# Patient Record
Sex: MALE | Race: WHITE | Employment: FULL TIME | URBAN - METROPOLITAN AREA
[De-identification: names, ages, dates, MRNs, and addresses within clinical notes are randomized per-mention and may not be internally consistent; named-entity substitution may affect disease eponyms.]

---

## 2017-09-05 ENCOUNTER — HOSPITAL ENCOUNTER (OUTPATIENT)
Dept: PHYSICAL THERAPY | Age: 62
Discharge: HOME | End: 2017-09-05
Payer: COMMERCIAL

## 2017-09-05 PROCEDURE — 97140 MANUAL THERAPY 1/> REGIONS: CPT

## 2017-09-05 PROCEDURE — 97535 SELF CARE MNGMENT TRAINING: CPT

## 2017-09-05 PROCEDURE — 97162 PT EVAL MOD COMPLEX 30 MIN: CPT

## 2017-09-05 NOTE — PROGRESS NOTES
[address] [place] [address] PHYSICAL THERAPY  [address] [place] [address] 201,[facility], 70 [facility] - Phone: [phone]  Fax: [phone] / [address] [facility]  Patient Name: Logan Ponce : 1955   Medical   Diagnosis: Right shoulder pain [M25.511] Treatment Diagnosis: Right shoulder pain [M25.511]   Onset Date: 17     Referral Source: Marcela Baxter Start of Care [facility]): 2017   Prior Hospitalization: See medical history Provider #: [id number]   Prior Level of Function: Pain with reaching activities   Comorbidities: OA   Medications: Verified on Patient Summary List   The Plan of Care and following information is based on the information from the initial evaluation.   ===========================================================================================  Assessment / denis information:  Kellie Taylor is a 65 y.o.  yo male with Dx of Right shoulder pain [M25.511]. He reports having R TSA on 17. He currently rates his pain as 8/10 at worst, 2/10 at best, primarily located at anterolateral aspect of his R shoulder. Objective Findings:  R Shoulder PROM: Flx = 92 deg, Abd = 90 deg:  Pt instructed in [facility] and will f/u in clinic for PT.  ===========================================================================================  Eval Complexity: History MEDIUM  Complexity : 1-2 comorbidities / personal factors will impact the outcome/ POC ;  Examination  MEDIUM Complexity : 3 Standardized tests and measures addressing body structure, function, activity limitation and / or participation in recreation ; Presentation MEDIUM Complexity : Evolving with changing characteristics ;   Decision Making MEDIUM Complexity : FOTO score of 26-74; Overall Complexity MEDIUM  Problem List: pain affecting function, decrease ROM, decrease strength, decrease ADL/ functional abilitiies, decrease activity tolerance and decrease flexibility/ joint mobility   Treatment Plan may include any combination of the following: Therapeutic exercise, Therapeutic activities, Neuromuscular re-education, Physical agent/modality, Manual therapy, Patient education, Self Care training and Functional mobility training  Patient / Family readiness to learn indicated by: asking questions, trying to perform skills and interest  Persons(s) to be included in education: patient (P)  Barriers to Learning/Limitations: no  Measures taken: FOTO = 39%   Patient Goal (s): Improve mobility of R shoulder   Patient self reported health status: good  Rehabilitation Potential: good   Short Term Goals: To be accomplished in  1-2  weeks:  1. Independent with HEP. 2. Decrease max pain 25-50% to assist with reaching and lifting    Long Term Goals: To be accomplished in  3-4  weeks:  1. Decrease max pain 50-75% to assist with reaching and lifting  2. Increase FOTO score to 45% to show functional improvment. 3.  Increase R shoulder PROM to 140 deg to assist with ADLs  Frequency / Duration:   Patient to be seen  2-3  times per week for 3-4  weeks:  Patient / Caregiver education and instruction: self care and exercises    Therapist Signature: Amanda Akers, CYNDI, OCS, SCS, CSCS Date: 0/0/2411   Certification Period: na Time: 6:25 PM   ===========================================================================================  I certify that the above Physical Therapy Services are being furnished while the patient is under my care. I agree with the treatment plan and certify that this therapy is necessary. Physician Signature:        Date:       Time:     Please sign and return to [facility] or you may fax the signed copy to [phone]. Thank you.

## 2017-09-05 NOTE — PROGRESS NOTES
PHYSICAL THERAPY - DAILY TREATMENT NOTE    Patient Name: Kellie Taylor        Date: 2017  : 1955   YES Patient  Verified  Visit #:     Insurance: Payor: /      In time: 5:50 Out time: 6:20   Total Treatment Time: 30     Medicare Time Tracking (below)   Total Timed Codes (min):  na 1:1 Treatment Time:  na     TREATMENT AREA =  Right shoulder pain [M25.511]    SUBJECTIVE  Pain Level (on 0 to 10 scale):    Medication Changes/New allergies or changes in medical history, any new surgeries or procedures? NO    If yes, update Summary List   Subjective Functional Status/Changes:  []  No changes reported     SEE IE          OBJECTIVE      10 min Manual Therapy: [address] mob, PROM Flx/Abd to 90 deg   Rationale:      decrease pain, increase ROM and increase tissue extensibility to improve patient's ability to reach/lift     min Patient Education:  YES  Reviewed HEP   []  Progressed/Changed HEP based on: Other Objective/Functional Measures:    SEE IE     Post Treatment Pain Level (on 0 to 10) scale:       ASSESSMENT  Assessment/Changes in Function:     SEE IE     []  See Progress Note/Recertification   Patient will continue to benefit from skilled PT services to modify and progress therapeutic interventions, address functional mobility deficits, address ROM deficits, address strength deficits, analyze and address soft tissue restrictions, analyze and cue movement patterns, analyze and modify body mechanics/ergonomics and assess and modify postural abnormalities to attain remaining goals.    Progress toward goals / Updated goals:         PLAN  []  Upgrade activities as tolerated YES Continue plan of care   []  Discharge due to :    []  Other:      Therapist: Tima Prado, PT, OCS, SCS, CSCS    Date: 2017 Time: 6:24 PM       Future Appointments  Date Time Provider Mena Kumar   2018 8:15 AM Alexei Roldan MD [id number]

## 2017-09-07 ENCOUNTER — HOSPITAL ENCOUNTER (OUTPATIENT)
Dept: PHYSICAL THERAPY | Age: 62
Discharge: HOME | End: 2017-09-07
Payer: COMMERCIAL

## 2017-09-07 PROCEDURE — 97140 MANUAL THERAPY 1/> REGIONS: CPT | Performed by: PHYSICAL THERAPIST

## 2017-09-07 PROCEDURE — 97110 THERAPEUTIC EXERCISES: CPT | Performed by: PHYSICAL THERAPIST

## 2017-09-07 NOTE — PROGRESS NOTES
[facility] PHYSICAL THERAPY - DAILY TREATMENT NOTE    Patient Name: Geoffrey Strickland        Date: 2017  : 1955   yes Patient  Verified  Visit #:     Insurance: Payor: Jennie Townsend / Plan: [address] / Product Type: Commerical /      In time: 530 Out time: 620   Total Treatment Time: 50     Medicare Time Tracking (below)   Total Timed Codes (min):  na 1:1 Treatment Time:  na     TREATMENT AREA =  Right shoulder pain [M25.511]    SUBJECTIVE  Pain Level (on 0 to 10 scale):  4  / 10   Medication Changes/New allergies or changes in medical history, any new surgeries or procedures?    no  If yes, update Summary List   Subjective Functional Status/Changes:  []  No changes reported     No pain or issues with the home exercises.  A little sore after the evaluation          OBJECTIVE  Modalities Rationale:     decrease inflammation, decrease pain and increase tissue extensibility to improve patient's ability to don/doff sling    min [] Estim, type/location:                                      []  att     []  unatt     []  w/US     []  w/ice    []  w/heat    min []  Mechanical Traction: type/lbs                   []  pro   []  sup   []  int   []  cont    []  before manual    []  after manual    min []  Ultrasound, settings/location:      min []  Iontophoresis w/ dexamethasone, location:                                               []  take home patch       []  in clinic   10 min [x]  Ice     []  Heat    location/position: Supine with bolster and red gripper    min []  Vasopneumatic Device, press/temp:     min []  Other:    [x] Skin assessment post-treatment (if applicable):    [x]  intact    []  redness- no adverse reaction     []redness  adverse reaction:        15 min Therapeutic Exercise:  [x]  See flow sheet   Rationale:      increase ROM and increase strength to improve the patients ability to don/doff sling     25 min Manual Therapy: ghj prom flexion/abd, elbow prom, stm anterior shoulder, pec and ut release   Rationale:      decrease pain, increase ROM, increase tissue extensibility and decrease trigger points to improve patient's ability to don/doff sling       min Patient Education:  yes  Reviewed HEP   []  Progressed/Changed HEP based on: Other Objective/Functional Measures:    Reported tenderness along anterior shoulder   Completed te as per flow sheet     Post Treatment Pain Level (on 0 to 10) scale:   2  / 10     ASSESSMENT  Assessment/Changes in Function:     No increase in pain following initial te session      []  See Progress Note/Recertification   Patient will continue to benefit from skilled PT services to modify and progress therapeutic interventions, address functional mobility deficits, address ROM deficits, address strength deficits, analyze and address soft tissue restrictions, analyze and cue movement patterns, analyze and modify body mechanics/ergonomics, assess and modify postural abnormalities and instruct in home and community integration to attain remaining goals.    Progress toward goals / Updated goals:    Compliant with hep     PLAN  []  Upgrade activities as tolerated yes Continue plan of care   []  Discharge due to :    []  Other:      Therapist: Maria Ines Cordero PT    Date: 9/7/2017 Time: 6:16 PM     Future Appointments  Date Time Provider Mena Kumar   9/12/2017 5:00 PM Jim Newton PT [facility]   9/14/2017 4:00 PM Tami Encinas, PTA [facility]   9/19/2017 5:30 PM Jim Newton, PT [facility]   9/21/2017 5:00 PM Faizan Heramon, PT [facility]   9/26/2017 5:00 PM Jim Newton PT [facility]   9/28/2017 5:00 PM Arlys Heap, PT [facility]   10/3/2017 5:00 PM Jim Newton, PT [facility]   10/5/2017 5:00 PM Arlys Heramon, PT [facility]   5/29/2018 8:15 AM Darío Sims MD [id number]

## 2017-09-12 ENCOUNTER — HOSPITAL ENCOUNTER (OUTPATIENT)
Dept: PHYSICAL THERAPY | Age: 62
Discharge: HOME | End: 2017-09-12
Payer: COMMERCIAL

## 2017-09-12 PROCEDURE — 97140 MANUAL THERAPY 1/> REGIONS: CPT

## 2017-09-12 PROCEDURE — 97110 THERAPEUTIC EXERCISES: CPT

## 2017-09-12 NOTE — PROGRESS NOTES
PHYSICAL THERAPY - DAILY TREATMENT NOTE    Patient Name: Carole Augustin        Date: 2017  : 1955   YES Patient  Verified  Visit #:   3   of   12  Insurance: Payor: Manjinder Zhou / Plan:  [address] / Product Type: Commerical /      In time: 5:00 Out time: 5:45   Total Treatment Time: 45     Medicare Time Tracking (below)   Total Timed Codes (min):  na 1:1 Treatment Time:  na     TREATMENT AREA =  Right shoulder pain [M25.511]    SUBJECTIVE  Pain Level (on 0 to 10 scale):  4  / 10   Medication Changes/New allergies or changes in medical history, any new surgeries or procedures?     NO    If yes, update Summary List   Subjective Functional Status/Changes:  []  No changes reported     No new c/0          OBJECTIVE  Modalities Rationale:     decrease inflammation, decrease pain and increase tissue extensibility to improve patient's ability to don/doff sling                 min [] Estim, type/location:                                       []  att     []  unatt     []  w/US     []  w/ice    []  w/heat     min []  Mechanical Traction: type/lbs                    []  pro   []  sup   []  int   []  cont    []  before manual    []  after manual     min []  Ultrasound, settings/location:        min []  Iontophoresis w/ dexamethasone, location:                                                []  take home patch       []  in clinic   10 min [x]  Ice     []  Heat    location/position: Supine with bolster and red gripper     min []  Vasopneumatic Device, press/temp:       min []  Other:     [x] Skin assessment post-treatment (if applicable):    [x]  intact    []  redness- no adverse reaction     []redness  adverse reaction:         12 min Therapeutic Exercise:  [x]  See flow sheet   Rationale:      increase ROM and increase strength to improve the patients ability to don/doff sling      23 min Manual Therapy: ghj prom flexion/abd, elbow prom, DTM teres,  pec and ut release   Rationale:      decrease pain, increase ROM, increase tissue extensibility and decrease trigger points to improve patient's ability to don/doff sling     min Patient Education:  YES  Reviewed HEP   []  Progressed/Changed HEP based on: Other Objective/Functional Measures:    Able to achieve 100 deg of AAROM without increase in pain      Post Treatment Pain Level (on 0 to 10) scale:   4  / 10     ASSESSMENT  Assessment/Changes in Function:     Good cristela to all Rx without increase in pain      []  See Progress Note/Recertification   Patient will continue to benefit from skilled PT services to modify and progress therapeutic interventions, address functional mobility deficits, address ROM deficits, address strength deficits, analyze and address soft tissue restrictions, analyze and cue movement patterns, analyze and modify body mechanics/ergonomics and assess and modify postural abnormalities to attain remaining goals.    Progress toward goals / Updated goals:    Slowly progressing with pain reduction      PLAN  []  Upgrade activities as tolerated YES Continue plan of care   []  Discharge due to :    []  Other:      Therapist: Shea Stuart, PT, OCS, SCS, CSCS    Date: 9/12/2017 Time: 11:25 AM       Future Appointments  Date Time Provider Mena Kumar   9/12/2017 5:00 PM Niranjan Brink, PT [facility]   9/14/2017 4:00 PM Ayla Garnica, PTA [facility]   9/19/2017 5:30 PM Niranjan Brink, PT [facility]   9/21/2017 5:00 PM Sindhu Warren, PT [facility]   9/26/2017 5:00 PM Niranjan Brink, PT [facility]   9/28/2017 5:00 PM Sindhu Warren, PT [facility]   10/3/2017 5:00 PM Niranjan Brink, PT [facility]   10/5/2017 5:00 PM Sindhu Warren, PT [facility]   5/29/2018 8:15 AM Megan Ramos MD [id number]

## 2017-09-14 ENCOUNTER — HOSPITAL ENCOUNTER (OUTPATIENT)
Dept: PHYSICAL THERAPY | Age: 62
Discharge: HOME | End: 2017-09-14
Payer: COMMERCIAL

## 2017-09-14 PROCEDURE — 97110 THERAPEUTIC EXERCISES: CPT

## 2017-09-14 PROCEDURE — 97140 MANUAL THERAPY 1/> REGIONS: CPT

## 2017-09-14 NOTE — PROGRESS NOTES
PHYSICAL THERAPY - DAILY TREATMENT NOTE    Patient Name: Pierre Mnauel        Date: 2017  : 1955   YES Patient  Verified  Visit #:     Insurance: Payor: Erendira Osorio / Plan:  [address] [facility] / Product Type: Commerical /      In time: 355 Out time: 450   Total Treatment Time: 55     Medicare Time Tracking (below)   Total Timed Codes (min):  45 1:1 Treatment Time:       TREATMENT AREA =  Right shoulder pain [M25.511]    SUBJECTIVE  Pain Level (on 0 to 10 scale):  4  / 10   Medication Changes/New allergies or changes in medical history, any new surgeries or procedures? NO    If yes, update Summary List   Subjective Functional Status/Changes:  []  No changes reported     \"The (R) shoulder is feeling better than the (L) one now. \"        OBJECTIVE  Modalities Rationale:     decrease inflammation and decrease pain to improve patient's ability to perform pain free ADLs. min [] Estim, type/location:                                      []  att     []  unatt     []  w/US     []  w/ice    []  w/heat    min []  Mechanical Traction: type/lbs                   []  pro   []  sup   []  int   []  cont    []  before manual    []  after manual    min []  Ultrasound, settings/location:      min []  Iontophoresis w/ dexamethasone, location:                                               []  take home patch       []  in clinic   10 min [x]  Ice     []  Heat    location/position: Supine, (R) shoulder. min []  Vasopneumatic Device, press/temp:     min []  Other:    [x] Skin assessment post-treatment (if applicable):    [x]  intact    []  redness- no adverse reaction     []redness  adverse reaction:        30 min Therapeutic Exercise:  [x]  See flow sheet   Rationale:      increase ROM, increase strength and improve coordination to improve the patients ability to perform pain free overhead ADLs. 15 Min Manual Therapy: (R) shoulder PROM.      Rationale:      decrease pain, increase ROM, increase tissue extensibility and decrease trigger points to improve patient's ability to perform pain free overhead ADLs. min Patient Education:  YES  Reviewed HEP   []  Progressed/Changed HEP based on: Other Objective/Functional Measures: Therex per flow sheet. Post Treatment Pain Level (on 0 to 10) scale:   0  / 10     ASSESSMENT  Assessment/Changes in Function:     Difficulty relaxing shoulder during manual.  Good response to distraction w/ oscillation. []  See Progress Note/Recertification   Patient will continue to benefit from skilled PT services to modify and progress therapeutic interventions, address functional mobility deficits, address ROM deficits, address strength deficits, analyze and address soft tissue restrictions, analyze and cue movement patterns, analyze and modify body mechanics/ergonomics and assess and modify postural abnormalities to attain remaining goals. Progress toward goals / Updated goals:    No change in progress toward LTG's with today's session.       PLAN  [x]  Upgrade activities as tolerated YES Continue plan of care   []  Discharge due to :    []  Other:      Therapist: Fazal Shafer PTA    Date: 9/14/2017 Time: 4:44 PM     Future Appointments  Date Time Provider Mena Kumar   9/19/2017 5:30 PM Carlos Richardson, PT [facility]   9/21/2017 5:00 PM Lakshmi Easton, PT [facility]   9/26/2017 5:00 PM Carlos Richardson, PT [facility]   9/28/2017 5:00 PM Lakshmi Easton, PT [facility]   10/3/2017 5:00 PM Carlos Richardson PT [facility]   10/5/2017 5:00 PM Lakshmi Easton, PT [facility]   5/29/2018 8:15 AM Laureen Weathers MD [address] [facility]

## 2017-09-19 ENCOUNTER — HOSPITAL ENCOUNTER (OUTPATIENT)
Dept: PHYSICAL THERAPY | Age: 62
Discharge: HOME | End: 2017-09-19
Payer: COMMERCIAL

## 2017-09-19 PROCEDURE — 97110 THERAPEUTIC EXERCISES: CPT

## 2017-09-19 PROCEDURE — 97140 MANUAL THERAPY 1/> REGIONS: CPT

## 2017-09-19 NOTE — PROGRESS NOTES
PHYSICAL THERAPY - DAILY TREATMENT NOTE    Patient Name: Juaquin Zimmer        Date: 2017  : 1955   YES Patient  Verified  Visit #:     Insurance: Payor: Katie Reginain / Plan:  [address] [facility] / Product Type: Commerical /      In time: 5:30 Out time: 6:05   Total Treatment Time: 35     Medicare Time Tracking (below)   Total Timed Codes (min):  na 1:1 Treatment Time:  na     TREATMENT AREA =  Right shoulder pain [M25.511]    SUBJECTIVE  Pain Level (on 0 to 10 scale):  4  / 10   Medication Changes/New allergies or changes in medical history, any new surgeries or procedures? NO    If yes, update Summary List   Subjective Functional Status/Changes:  []  No changes reported     No new c/o             25 min Therapeutic Exercise:  [x]  See flow sheet   Rationale:      increase ROM, increase strength and improve coordination to improve the patients ability to perform pain free overhead ADLs.    10 Min Manual Therapy: DTM Lat, teres, PROM Flx/Scaption    Rationale:      decrease pain, increase ROM, increase tissue extensibility and decrease trigger points to improve patient's ability to perform pain free overhead ADLs. min Patient Education:  YES  Reviewed HEP   []  Progressed/Changed HEP based on:         Other Objective/Functional Measures:    Able to easily achieve 100 deg of flx after man Rx today     Post Treatment Pain Level (on 0 to 10) scale:   3.5  / 10     ASSESSMENT  Assessment/Changes in Function:     Good cirstela to all Rx without increase in pain      []  See Progress Note/Recertification   Patient will continue to benefit from skilled PT services to modify and progress therapeutic interventions, address functional mobility deficits, address ROM deficits, address strength deficits, analyze and address soft tissue restrictions, analyze and cue movement patterns, analyze and modify body mechanics/ergonomics and assess and modify postural abnormalities to attain remaining goals.   Progress toward goals / Updated goals:    Progressing slowly with ROM      PLAN  []  Upgrade activities as tolerated YES Continue plan of care   []  Discharge due to :    []  Other:      Therapist: Rxe Mariee, PT, OCS, SCS, CSCS    Date: 9/19/2017 Time: 10:01 AM       Future Appointments  Date Time Provider Mena Kumar   9/19/2017 5:30 PM Kelsie Kelley PT [facility]   9/21/2017 5:00 PM Gera Murillo, PT [facility]   9/26/2017 5:00 PM Kelsie Kelley PT [facility]   9/28/2017 5:00 PM Gera Murillo PT [facility]   10/3/2017 5:00 PM Kelsie Kelley, PT [facility]   10/5/2017 5:00 PM Gera Murillo, PT [facility]   5/29/2018 8:15 AM Jamari Brennan MD [address] [facility]

## 2017-09-21 ENCOUNTER — HOSPITAL ENCOUNTER (OUTPATIENT)
Dept: PHYSICAL THERAPY | Age: 62
Discharge: HOME | End: 2017-09-21
Payer: COMMERCIAL

## 2017-09-21 PROCEDURE — 97110 THERAPEUTIC EXERCISES: CPT

## 2017-09-21 PROCEDURE — 97140 MANUAL THERAPY 1/> REGIONS: CPT

## 2017-09-21 NOTE — PROGRESS NOTES
PHYSICAL THERAPY - DAILY TREATMENT NOTE    Patient Name: Juaquin Zimmer        Date: 2017  : 1955   YES Patient  Verified  Visit #:     Insurance: Payor: Katie Howe / Plan:  [address] / Product Type: Commerical /      In time: 500 Out time: 536   Total Treatment Time: 36     Medicare Time Tracking (below)   Total Timed Codes (min):  na 1:1 Treatment Time:  na     TREATMENT AREA =  Right shoulder pain [M25.511]    SUBJECTIVE  Pain Level (on 0 to 10 scale):  0  / 10   Medication Changes/New allergies or changes in medical history, any new surgeries or procedures? NO    If yes, update Summary List   Subjective Functional Status/Changes:  []  No changes reported     Patient reports his R shoulder is feeling and moving better than his L shoulder. Patient denies any complications since his LV. OBJECTIVE  Modalities Rationale:     decrease inflammation and decrease pain to improve patient's ability to perform self care activities. min [] Estim, type/location:                                      []  att     []  unatt     []  w/US     []  w/ice    []  w/heat    min []  Mechanical Traction: type/lbs                   []  pro   []  sup   []  int   []  cont    []  before manual    []  after manual    min []  Ultrasound, settings/location:      min []  Iontophoresis w/ dexamethasone, location:                                               []  take home patch       []  in clinic   10 min [x]  Ice     []  Heat    location/position: To R shoulder in long sit    min []  Vasopneumatic Device, press/temp:     min []  Other:    [x] Skin assessment post-treatment (if applicable):    [x]  intact    []  redness- no adverse reaction     []redness  adverse reaction:        14 min Therapeutic Exercise:  [x]  See flow sheet   Rationale:      increase ROM and increase strength to improve the patients ability to perform reaching activities.       12 min Manual Therapy: R shoulder PROM within protocol, STM to R infraspinatus, R teres minor, R anterior delt, R pec minor; scar massage   Rationale:      decrease pain, increase ROM, increase tissue extensibility and decrease trigger points to improve patient's ability to perform carrying activities. min Patient Education:  YES  Reviewed HEP   []  Progressed/Changed HEP based on: Other Objective/Functional Measures:    Patient demonstrating good tolerance to PROM treatment, minimal restrictions within desired ranges  Good technique with current exercise program indicating good carry over from previous sessions. Post Treatment Pain Level (on 0 to 10) scale:   0  / 10     ASSESSMENT  Assessment/Changes in Function:     Patient denied changes in symptoms post session. Patient progressing appropriately with current program at this time. []  See Progress Note/Recertification   Patient will continue to benefit from skilled PT services to modify and progress therapeutic interventions, address functional mobility deficits, address ROM deficits, address strength deficits, analyze and address soft tissue restrictions, analyze and cue movement patterns, analyze and modify body mechanics/ergonomics and assess and modify postural abnormalities to attain remaining goals.    Progress toward goals / Updated goals:    Progressing with LTG#1     PLAN  [x]  Upgrade activities as tolerated YES Continue plan of care   []  Discharge due to :    []  Other:      Therapist: Antonio Middleton PT    Date: 9/21/2017 Time: 6:32 PM     Future Appointments  Date Time Provider Mena Kumar   9/26/2017 5:00 PM Cinthya Gamez PT [facility]   9/28/2017 5:00 PM Antonio Middleton, PT [facility]   10/3/2017 5:00 PM Cinthya Gamez PT [facility]   10/5/2017 5:00 PM Antonio Middleton, PT [facility]   5/29/2018 8:15 AM Alexei Roldan MD [address] [facility]

## 2017-09-26 ENCOUNTER — HOSPITAL ENCOUNTER (OUTPATIENT)
Dept: PHYSICAL THERAPY | Age: 62
Discharge: HOME | End: 2017-09-26
Payer: COMMERCIAL

## 2017-09-26 PROCEDURE — 97110 THERAPEUTIC EXERCISES: CPT

## 2017-09-26 PROCEDURE — 97140 MANUAL THERAPY 1/> REGIONS: CPT

## 2017-09-26 NOTE — PROGRESS NOTES
PHYSICAL THERAPY - DAILY TREATMENT NOTE    Patient Name: Eva Huitron        Date: 2017  : 1955   YES Patient  Verified  Visit #:     Insurance: Payor: Annalee Swain / Plan:  [address] / Product Type: Commerical /      In time: 5:10 Out time: 5:40   Total Treatment Time: 30     Medicare Time Tracking (below)   Total Timed Codes (min):  na 1:1 Treatment Time:  na     TREATMENT AREA =  Right shoulder pain [M25.511]    SUBJECTIVE  Pain Level (on 0 to 10 scale):  2  / 10   Medication Changes/New allergies or changes in medical history, any new surgeries or procedures? NO    If yes, update Summary List   Subjective Functional Status/Changes:  []  No changes reported     Its been feeling good.            OBJECTIVE    Modalities Rationale:     decrease inflammation and decrease pain to improve patient's ability to perform self care activities.                 min [] Estim, type/location:                                       []  att     []  unatt     []  w/US     []  w/ice    []  w/heat     min []  Mechanical Traction: type/lbs                    []  pro   []  sup   []  int   []  cont    []  before manual    []  after manual     min []  Ultrasound, settings/location:        min []  Iontophoresis w/ dexamethasone, location:                                                []  take home patch       []  in clinic   10 min [x]  Ice     []  Heat    location/position: To R shoulder in long sit     min []  Vasopneumatic Device, press/temp:       min []  Other:     [x] Skin assessment post-treatment (if applicable):    [x]  intact    []  redness- no adverse reaction     []redness  adverse reaction:         10 min Therapeutic Exercise:  [x]  See flow sheet   Rationale:      increase ROM and increase strength to improve the patients ability to perform reaching activities.       10 min Manual Therapy: DTM teres, scar mass, GH mob, PROM FLx/Abd   Rationale:      decrease pain, increase ROM, increase tissue extensibility and decrease trigger points to improve patient's ability to perform carrying activities. min Patient Education:  YES  Reviewed HEP   []  Progressed/Changed HEP based on: Other Objective/Functional Measures:    FOTO = 46  GROC = +5     Post Treatment Pain Level (on 0 to 10) scale:   0  / 10     ASSESSMENT  Assessment/Changes in Function:     Good cristela to all Rx without increase in pain      []  See Progress Note/Recertification   Patient will continue to benefit from skilled PT services to modify and progress therapeutic interventions, address functional mobility deficits, address ROM deficits, address strength deficits, analyze and address soft tissue restrictions, analyze and cue movement patterns, analyze and modify body mechanics/ergonomics and assess and modify postural abnormalities to attain remaining goals.    Progress toward goals / Updated goals:    Slowly progressing with ROM      PLAN  []  Upgrade activities as tolerated YES Continue plan of care   []  Discharge due to :    []  Other:      Therapist: Lars Nath, PT, OCS, SCS, CSCS    Date: 9/26/2017 Time: 12:56 PM       Future Appointments  Date Time Provider Mena Kumar   9/26/2017 5:00 PM Radha Falcon PT [place] [address]   9/28/2017 5:00 PM Cirilo Newsome PT [place] [address]   10/3/2017 5:00 PM Radha Falcon PT [place] [address]   10/5/2017 5:00 PM Cirilo Newsome PT [address]   5/29/2018 8:15 AM Jeevan Roman MD [address] [facility]

## 2017-09-28 ENCOUNTER — HOSPITAL ENCOUNTER (OUTPATIENT)
Dept: PHYSICAL THERAPY | Age: 62
Discharge: HOME | End: 2017-09-28
Payer: COMMERCIAL

## 2017-09-28 PROCEDURE — 97110 THERAPEUTIC EXERCISES: CPT

## 2017-09-28 PROCEDURE — 97140 MANUAL THERAPY 1/> REGIONS: CPT

## 2017-09-28 NOTE — PROGRESS NOTES
PHYSICAL THERAPY - DAILY TREATMENT NOTE    Patient Name: Isa Bermeo        Date: 2017  : 1955   YES Patient  Verified  Visit #:     Insurance: Payor: Christy Christina / Plan:  [address] [facility] / Product Type: Commerical /      In time: 505 Out time: 535   Total Treatment Time: 30     Medicare Time Tracking (below)   Total Timed Codes (min):  na 1:1 Treatment Time:  na     TREATMENT AREA =  Right shoulder pain [M25.511]    SUBJECTIVE  Pain Level (on 0 to 10 scale):  2.5  / 10   Medication Changes/New allergies or changes in medical history, any new surgeries or procedures? NO    If yes, update Summary List   Subjective Functional Status/Changes:  []  No changes reported     Patient reports his shoulder is sore near the incision, however otherwise is doing well today. OBJECTIVE  Modalities Rationale:     decrease inflammation and decrease pain to improve patient's ability to perform self care activities. min [] Estim, type/location:                                      []  att     []  unatt     []  w/US     []  w/ice    []  w/heat    min []  Mechanical Traction: type/lbs                   []  pro   []  sup   []  int   []  cont    []  before manual    []  after manual    min []  Ultrasound, settings/location:      min []  Iontophoresis w/ dexamethasone, location:                                               []  take home patch       []  in clinic   10 min [x]  Ice     []  Heat    location/position: To R shoulder in long sit    min []  Vasopneumatic Device, press/temp:     min []  Other:    [x] Skin assessment post-treatment (if applicable):    [x]  intact    []  redness- no adverse reaction     []redness  adverse reaction:        10 min Therapeutic Exercise:  [x]  See flow sheet   Rationale:      increase ROM to improve the patients ability to perform reaching activities.      10 min Manual Therapy: R shoulder PROM, STM to R post op incision, R pec minor, R anterior deltoid, R posterior RC   Rationale:      decrease pain, increase ROM, increase tissue extensibility and decrease trigger points to improve patient's ability to perform self care activities. min Patient Education:  YES  Reviewed HEP   []  Progressed/Changed HEP based on: Other Objective/Functional Measures:    Good knowledge of current exercise program, added weight shift at table for improved muscular activation at shoulder joint  Good tolerance to PROM within MD protocol at this time     Post Treatment Pain Level (on 0 to 10) scale:   0  / 10     ASSESSMENT  Assessment/Changes in Function:     Patient noted reduced symptoms post session. Patient progressing appropriately at this time. []  See Progress Note/Recertification   Patient will continue to benefit from skilled PT services to modify and progress therapeutic interventions, address functional mobility deficits, address ROM deficits, address strength deficits, analyze and address soft tissue restrictions, analyze and cue movement patterns, analyze and modify body mechanics/ergonomics and assess and modify postural abnormalities to attain remaining goals.    Progress toward goals / Updated goals:    Progressing with LTG#1     PLAN  [x]  Upgrade activities as tolerated YES Continue plan of care   []  Discharge due to :    []  Other:      Therapist: Xiao Newberry, PT    Date: 9/28/2017 Time: 6:51 PM     Future Appointments  Date Time Provider Mena Kumar   10/3/2017 5:00 PM Sonia Beal, PT [facility]   10/5/2017 5:00 PM Xiao Newberry, PT [facility]   10/10/2017 4:00 PM Bakari Green, PTA [facility]   10/12/2017 2:00 PM Bakari Green, PTA [facility]   10/17/2017 5:00 PM Xiao Newberry PT [facility]   10/19/2017 5:00 PM Bakari Green, PTA [facility]   10/24/2017 4:30 PM Sonia Beal, PT [facility]   10/26/2017 5:00 PM Xiao Newberry PT [facility]   10/31/2017 5:00 PM Xiao Newberry [facility]   11/2/2017 5:00 PM Rhonda Makenzie Deal, PT [facility]   5/29/2018 8:15 AM Gosia Mckeon  [facility] [address]

## 2017-10-03 ENCOUNTER — HOSPITAL ENCOUNTER (OUTPATIENT)
Dept: PHYSICAL THERAPY | Age: 62
Discharge: HOME | End: 2017-10-03
Payer: COMMERCIAL

## 2017-10-03 PROCEDURE — 97140 MANUAL THERAPY 1/> REGIONS: CPT

## 2017-10-03 PROCEDURE — 97110 THERAPEUTIC EXERCISES: CPT

## 2017-10-03 NOTE — PROGRESS NOTES
[facility] PHYSICAL THERAPY  [address] [place] [address] [address], [address],[facility], [address] [facility] - Phone: [phone]  Fax: [phone]  PROGRESS NOTE  Patient Name: Tico Hargrove : 1955   Treatment/Medical Diagnosis: Right shoulder pain [M25.511]   Referral Source: Tomy Samuels     Date of Initial Visit: 17 Attended Visits: 9 Missed Visits: 0     SUMMARY OF TREATMENT  Tico Hargorve has been seen at our clinic 2-3x/wk for a total of 9 visits. Pt treatment has consisted of  therapeutic exercise for shoulder ROM and manual therapy (jt mobilization and deep tissue mobilization)  CURRENT STATUS  Pt has had a good tolerance to physical therapy treatment. Although he reports a spike in his pain level when he drove for a long period of time (5/10), he reports overall decrease in his pain level. Most recent measures are as follows: R Shoulder PROM: Flx: 143 deg (was 92 deg), Abduction: 125 deg (was 90 at IE). Goal/Measure of Progress Goal Met? 1. Decrease Max pain by 50-75% to assist with ADLs   Status at last Eval: 8/10 Current Status: 5/10 progressing   2. Increase FOTO score to 45 % show functional improvement   Status at last Eval: 39% Current Status: 46% yes   3. Increase R shoulder PROM Flx/abd to 140 deg to assist with ADLs   Status at last Eval: See above Current Status: See above progressing     New Goals to be achieved in __4__  weeks:  1. Continue with goal #1 and #3 above   2.     3.     RECOMMENDATIONS    Specifics: 2-3x/wk x 4 more wks  If you have any questions/comments please contact us directly at [phone]. Thank you for allowing us to assist in the care of your patient.     Therapist Signature: Atul Calderon, CYNDI, OCS, SCS, CSCS Date: 10/3/2017     Time: 6:30 PM   NOTE TO PHYSICIAN:  PLEASE COMPLETE THE ORDERS BELOW AND FAX TO   [facility]: [phone]  If you are unable to process this request in 24 hours please contact our office: [phone]    ___ I have read the above report and request that my patient continue as recommended.   ___ I have read the above report and request that my patient continue therapy with the following changes/special instructions:_________________________________________________________   ___ I have read the above report and request that my patient be discharged from therapy.      Physician Signature:        Date:       Time:

## 2017-10-03 NOTE — PROGRESS NOTES
PHYSICAL THERAPY - DAILY TREATMENT NOTE    Patient Name: Belgica Cobos        Date: 10/3/2017  : 1955   YES Patient  Verified  Visit #:     Insurance: Payor: Kinjal Vasques / Plan: [address] / Product Type: Commerical /      In time: 4:45 Out time: 5:15   Total Treatment Time: 40     Medicare Time Tracking (below)   Total Timed Codes (min):  na 1:1 Treatment Time:  na     TREATMENT AREA =  Right shoulder pain [M25.511]    SUBJECTIVE  Pain Level (on 0 to 10 scale):  3.5  / 10   Medication Changes/New allergies or changes in medical history, any new surgeries or procedures? NO    If yes, update Summary List   Subjective Functional Status/Changes:  []  No changes reported     It was 2-3/10 pain on average, but the pain shot up to 5/10 when I drove for a long period of time. OBJECTIVE  Modalities Rationale:     decrease inflammation and decrease pain to improve patient's ability to perform self care activities.                          min [] Estim, type/location:                                       []  att     []  unatt     []  w/US     []  w/ice    []  w/heat      min []  Mechanical Traction: type/lbs                     []  pro   []  sup   []  int   []  cont    []  before manual    []  after manual      min []  Ultrasound, settings/location:          min []  Iontophoresis w/ dexamethasone, location:                                                 []  take home patch       []  in clinic   10 min [x]  Ice     []  Heat    location/position: To R shoulder in long sit      min []  Vasopneumatic Device, press/temp:         min []  Other:      [x] Skin assessment post-treatment (if applicable):    [x]  intact    []  redness- no adverse reaction     []redness  adverse reaction:          20 min Therapeutic Exercise:  [x]  See flow sheet   Rationale:      increase ROM and increase strength to improve the patients ability to perform reaching activities.         10 min Manual Therapy: DTM teres, scar mass, GH mob, PROM FLx/Abd   Rationale:      decrease pain, increase ROM, increase tissue extensibility and decrease trigger points to improve patient's ability to perform carrying activities. min Patient Education:  YES  Reviewed HEP   []  Progressed/Changed HEP based on: Other Objective/Functional Measures:    PROM Flx = 143, ABd = 125       Post Treatment Pain Level (on 0 to 10) scale:   0  / 10     ASSESSMENT  Assessment/Changes in Function:     Good cristela to all Rx without increase in pain      []  See Progress Note/Recertification   Patient will continue to benefit from skilled PT services to modify and progress therapeutic interventions, address functional mobility deficits, address ROM deficits, address strength deficits, analyze and address soft tissue restrictions, analyze and cue movement patterns, analyze and modify body mechanics/ergonomics and assess and modify postural abnormalities to attain remaining goals.    Progress toward goals / Updated goals:    Slowly progressing with ROM      PLAN  []  Upgrade activities as tolerated YES Continue plan of care   []  Discharge due to :    []  Other:      Therapist: Madeleine Mccabe, PT, OCS, SCS, CSCS    Date: 10/3/2017 Time: 12:21 PM       Future Appointments  Date Time Provider Mena Kumar   10/3/2017 5:00 PM [facility]   10/5/2017 5:00 PM Lee Spine, [facility]   10/10/2017 4:00 PM Katelyn Fernandes, [facility]   10/12/2017 2:00 PM Katelyn Fernandes, [facility]   10/17/2017 5:00 PM Lee Spine[facility]   10/19/2017 5:00 PM Katelyn Fernandes, [facility]   10/24/2017 4:30 PM Sher Perez[facility]   10/26/2017 5:00 PM Lee Spine, [facility]   10/31/2017 5:00 PM Lee Spine, [facility]   11/2/2017 5:00 PM [facility] Sal[facility]   5/29/2018 8:15 AM Junior Zenaida MD [address]

## 2017-10-10 ENCOUNTER — HOSPITAL ENCOUNTER (OUTPATIENT)
Dept: PHYSICAL THERAPY | Age: 62
Discharge: HOME | End: 2017-10-10
Payer: COMMERCIAL

## 2017-10-10 PROCEDURE — 97140 MANUAL THERAPY 1/> REGIONS: CPT

## 2017-10-10 PROCEDURE — 97110 THERAPEUTIC EXERCISES: CPT

## 2017-10-10 NOTE — PROGRESS NOTES
PHYSICAL THERAPY - DAILY TREATMENT NOTE    Patient Name: Rock Fowler        Date: 10/10/2017  : 1955   YES Patient  Verified  Visit #:   10   of   12  Insurance: Payor: Maurice Lara / Plan: [address] / Product Type: Commerical /      In time: 4 Out time: 445   Total Treatment Time: 45     Medicare Time Tracking (below)   Total Timed Codes (min):  35 1:1 Treatment Time:       TREATMENT AREA =  Right shoulder pain [M25.511]    SUBJECTIVE  Pain Level (on 0 to 10 scale):  3.5   / 10   Medication Changes/New allergies or changes in medical history, any new surgeries or procedures? NO    If yes, update Summary List   Subjective Functional Status/Changes:  []  No changes reported     No new complaints. OBJECTIVE  Modalities Rationale:     decrease inflammation and decrease pain to improve patient's ability to perform pain free ADLs. min [] Estim, type/location:                                      []  att     []  unatt     []  w/US     []  w/ice    []  w/heat    min []  Mechanical Traction: type/lbs                   []  pro   []  sup   []  int   []  cont    []  before manual    []  after manual    min []  Ultrasound, settings/location:      min []  Iontophoresis w/ dexamethasone, location:                                               []  take home patch       []  in clinic   10 min [x]  Ice     []  Heat    location/position: Supine, (R) shoulder. min []  Vasopneumatic Device, press/temp:     min []  Other:    [x] Skin assessment post-treatment (if applicable):    [x]  intact    []  redness- no adverse reaction     []redness  adverse reaction:        20 min Therapeutic Exercise:  [x]  See flow sheet   Rationale:      increase ROM, increase strength and improve coordination to improve the patients ability to perform pain free ADLs. 15 Min Manual Therapy: (R) shoulder PROM.      Rationale:      decrease pain, increase ROM, increase tissue extensibility and decrease trigger points to improve patient's ability to perform pain free ADLs. min Patient Education:  YES  Reviewed HEP   []  Progressed/Changed HEP based on: Other Objective/Functional Measures: Therex per flow sheet. Post Treatment Pain Level (on 0 to 10) scale:   0   / 10     ASSESSMENT  Assessment/Changes in Function:     No exacerbation of symptoms with today's session. []  See Progress Note/Recertification   Patient will continue to benefit from skilled PT services to modify and progress therapeutic interventions, address functional mobility deficits, address ROM deficits, address strength deficits, analyze and address soft tissue restrictions, analyze and cue movement patterns, analyze and modify body mechanics/ergonomics and assess and modify postural abnormalities to attain remaining goals. Progress toward goals / Updated goals:    No change in progress toward LTG's with today's session.       PLAN  [x]  Upgrade activities as tolerated YES Continue plan of care   []  Discharge due to :    []  Other:      Therapist: Tre Thurman PTA    Date: 10/10/2017 Time: 4:14 PM     Future Appointments  Date Time Provider Mena Kumar   10/12/2017 2:00 PM Tre Thurman PTA [facility]   10/17/2017 5:00 PM Nargis Baker, PT [facility]   10/19/2017 5:00 PM Tre Thurman PTA [facility]   10/24/2017 4:30 PM Luda Hunter, PT [facility]   10/26/2017 5:00 PM Nargis Baker, PT [facility]   10/31/2017 5:00 PM Nargis Baker, PT [facility]   11/2/2017 5:00 PM Shruti Mckeon, PT [facility]   5/29/2018 8:15 AM James Matthews MD [id number] [facility]

## 2017-10-12 ENCOUNTER — HOSPITAL ENCOUNTER (OUTPATIENT)
Dept: PHYSICAL THERAPY | Age: 62
Discharge: HOME | End: 2017-10-12
Payer: COMMERCIAL

## 2017-10-12 PROCEDURE — 97140 MANUAL THERAPY 1/> REGIONS: CPT

## 2017-10-12 PROCEDURE — 97110 THERAPEUTIC EXERCISES: CPT

## 2017-10-12 NOTE — PROGRESS NOTES
PHYSICAL THERAPY - DAILY TREATMENT NOTE    Patient Name: Moses Castrejon        Date: 10/12/2017  : 1955   YES Patient  Verified  Visit #:     Insurance: Payor: Joana Ashford / Plan: [address] [place] [address] [address] / Product Type: Commerical /      In time: 2 Out time: 3   Total Treatment Time: 60     Medicare Time Tracking (below)   Total Timed Codes (min):  50 1:1 Treatment Time:       TREATMENT AREA =  Right shoulder pain [M25.511]    SUBJECTIVE  Pain Level (on 0 to 10 scale):  2  / 10   Medication Changes/New allergies or changes in medical history, any new surgeries or procedures? NO    If yes, update Summary List   Subjective Functional Status/Changes:  []  No changes reported     \"I brought in a new prescription. The MD says I can move it a little more. \"           OBJECTIVE  Modalities Rationale:     decrease inflammation and decrease pain to improve patient's ability to perform pain free ADLs. min [] Estim, type/location:                                      []  att     []  unatt     []  w/US     []  w/ice    []  w/heat    min []  Mechanical Traction: type/lbs                   []  pro   []  sup   []  int   []  cont    []  before manual    []  after manual    min []  Ultrasound, settings/location:      min []  Iontophoresis w/ dexamethasone, location:                                               []  take home patch       []  in clinic   10 min [x]  Ice     []  Heat    location/position: Supine, (R) shoulder. min []  Vasopneumatic Device, press/temp:     min []  Other:    [x] Skin assessment post-treatment (if applicable):    [x]  intact    []  redness- no adverse reaction     []redness  adverse reaction:        35 min Therapeutic Exercise:  [x]  See flow sheet   Rationale:      increase ROM, increase strength and improve coordination to improve the patients ability to perform pain free ADLs. 15 Min Manual Therapy: (R) shoulder PROM. TPR (R) mid delt.     Rationale: decrease pain, increase ROM, increase tissue extensibility and decrease trigger points to improve patient's ability to perform pain free ADLs. min Patient Education:  YES  Reviewed HEP   []  Progressed/Changed HEP based on: Other Objective/Functional Measures: Added multiple exercises to promoted AROM due to updated prescription. See flow sheet. Post Treatment Pain Level (on 0 to 10) scale:   0   / 10     ASSESSMENT  Assessment/Changes in Function:     Good tolerance to additional exercises. []  See Progress Note/Recertification   Patient will continue to benefit from skilled PT services to modify and progress therapeutic interventions, address functional mobility deficits, address ROM deficits, address strength deficits, analyze and address soft tissue restrictions, analyze and cue movement patterns, analyze and modify body mechanics/ergonomics and assess and modify postural abnormalities to attain remaining goals. Progress toward goals / Updated goals:    Progressed ROM activities. Progressing toward LTG #3.        PLAN  [x]  Upgrade activities as tolerated YES Continue plan of care   []  Discharge due to :    []  Other:      Therapist: Yaritza Foster PTA    Date: 10/12/2017 Time: 2:10 PM     Future Appointments  Date Time Provider Mena Kumar   10/17/2017 5:00 PM Jyoti Pabon, PT [facility]   10/19/2017 5:00 PM Yaritza Foster PTA [facility]   10/24/2017 4:30 PM Ana Real, PT [facility]   10/26/2017 5:00 PM Jyoti Pabon, PT [facility]   10/31/2017 5:00 PM Jyoti Pabon, PT [facility]   11/2/2017 5:00 PM Chaiatnya Agarwal, PT [facility]   5/29/2018 8:15 AM Erick Corrigan MD [address] [facility]

## 2017-10-17 ENCOUNTER — HOSPITAL ENCOUNTER (OUTPATIENT)
Dept: PHYSICAL THERAPY | Age: 62
Discharge: HOME | End: 2017-10-17
Payer: COMMERCIAL

## 2017-10-17 PROCEDURE — 97110 THERAPEUTIC EXERCISES: CPT

## 2017-10-17 PROCEDURE — 97140 MANUAL THERAPY 1/> REGIONS: CPT

## 2017-10-17 NOTE — PROGRESS NOTES
PHYSICAL THERAPY - DAILY TREATMENT NOTE    Patient Name: Belgica Cobos        Date: 10/17/2017  : 1955   YES Patient  Verified  Visit #:   12   of   12(+8)  Insurance: Payor: Kinjal Vasques / Plan: [address] PT / Product Type: Commerical /      In time: 500 Out time: 550   Total Treatment Time: 50     Medicare Time Tracking (below)   Total Timed Codes (min):  na 1:1 Treatment Time:  na     TREATMENT AREA =  Right shoulder pain [M25.511]    SUBJECTIVE  Pain Level (on 0 to 10 scale):  3  / 10   Medication Changes/New allergies or changes in medical history, any new surgeries or procedures? NO    If yes, update Summary List   Subjective Functional Status/Changes:  []  No changes reported     Patient reports he was really sore after his last appointment which he related to the new exercises. Patient states this soreness persisted over the weekend. OBJECTIVE  Modalities Rationale:     decrease inflammation and decrease pain to improve patient's ability to perform self care activities. min [] Estim, type/location:                                      []  att     []  unatt     []  w/US     []  w/ice    []  w/heat    min []  Mechanical Traction: type/lbs                   []  pro   []  sup   []  int   []  cont    []  before manual    []  after manual    min []  Ultrasound, settings/location:      min []  Iontophoresis w/ dexamethasone, location:                                               []  take home patch       []  in clinic   10 min [x]  Ice     []  Heat    location/position: To R shoulder in supine with bolster    min []  Vasopneumatic Device, press/temp:     min []  Other:    [x] Skin assessment post-treatment (if applicable):    [x]  intact    []  redness- no adverse reaction     []redness  adverse reaction:        30 min Therapeutic Exercise:  [x]  See flow sheet   Rationale:      increase ROM and increase strength to improve the patients ability to perform carrying activities. 10 min Manual Therapy: STM to R pec minor, R posterior RC; Scar massage; R shoulder PROM   Rationale:      decrease pain, increase ROM, increase tissue extensibility and decrease trigger points to improve patient's ability to perform reaching activities. min Patient Education:  YES  Reviewed HEP   []  Progressed/Changed HEP based on: Other Objective/Functional Measures:    Patient required cuing on proper technique for cane IR stretch in order to achieve max benefit  Required cuing on muscle guarding throughout manual therapy treatment this session. Post Treatment Pain Level (on 0 to 10) scale:   2  / 10     ASSESSMENT  Assessment/Changes in Function:     Patient noted reduced discomfort post session. Patient appropriate to continue with progression with AAROM and AROM exercises in order to improve ADL function. []  See Progress Note/Recertification   Patient will continue to benefit from skilled PT services to modify and progress therapeutic interventions, address functional mobility deficits, address ROM deficits, address strength deficits, analyze and address soft tissue restrictions, analyze and cue movement patterns, analyze and modify body mechanics/ergonomics and assess and modify postural abnormalities to attain remaining goals.    Progress toward goals / Updated goals:    Progressing with LTG#3     PLAN  [x]  Upgrade activities as tolerated YES Continue plan of care   []  Discharge due to :    []  Other:      Therapist: Vikram Mendoza PT    Date: 10/17/2017 Time: 6:33 PM     Future Appointments  Date Time Provider Mena Kumar   10/19/2017 5:00 PM Jennie Middleton, PTA [facility]   10/24/2017 4:30 PM Saige Cordero, PT [facility]   10/26/2017 5:00 PM Vikram Mendoza PT [facility]   10/31/2017 5:00 PM Vikram Mendoza PT [facility]   11/2/2017 5:00 PM Magan Chandra, PT [facility]   5/29/2018 8:15 AM Escobar Osorio MD [address] [facility]

## 2017-10-19 ENCOUNTER — HOSPITAL ENCOUNTER (OUTPATIENT)
Dept: PHYSICAL THERAPY | Age: 62
Discharge: HOME | End: 2017-10-19
Payer: COMMERCIAL

## 2017-10-19 PROCEDURE — 97110 THERAPEUTIC EXERCISES: CPT

## 2017-10-19 PROCEDURE — 97140 MANUAL THERAPY 1/> REGIONS: CPT

## 2017-10-19 NOTE — PROGRESS NOTES
PHYSICAL THERAPY - DAILY TREATMENT NOTE    Patient Name: Ochoa Johnson        Date: 10/19/2017  : 1955   YES Patient  Verified  Visit #:   15   of   12(+8)   Insurance: Payor: Glenna Castro / Plan: [facility] [employer] [facility] / Product Type: Commerical /      In time: 5 Out time: 555   Total Treatment Time: 55     Medicare Time Tracking (below)   Total Timed Codes (min):  45 1:1 Treatment Time:       TREATMENT AREA =  Right shoulder pain [M25.511]    SUBJECTIVE  Pain Level (on 0 to 10 scale):  2  / 10   Medication Changes/New allergies or changes in medical history, any new surgeries or procedures? NO    If yes, update Summary List   Subjective Functional Status/Changes:  []  No changes reported     \"I was sore after the last time I saw you. I did better after the following visit. \"           OBJECTIVE  Modalities Rationale:     decrease inflammation and decrease pain to improve patient's ability to perform pain free ADLs. min [] Estim, type/location:                                      []  att     []  unatt     []  w/US     []  w/ice    []  w/heat    min []  Mechanical Traction: type/lbs                   []  pro   []  sup   []  int   []  cont    []  before manual    []  after manual    min []  Ultrasound, settings/location:      min []  Iontophoresis w/ dexamethasone, location:                                               []  take home patch       []  in clinic   10 min [x]  Ice     []  Heat    location/position: (R) shoulder, supine. min []  Vasopneumatic Device, press/temp:     min []  Other:    [x] Skin assessment post-treatment (if applicable):    [x]  intact    []  redness- no adverse reaction     []redness  adverse reaction:        30 min Therapeutic Exercise:  [x]  See flow sheet   Rationale:      increase ROM, increase strength and improve coordination to improve the patients ability to perform pain free ADLs. 15 min Manual Therapy: (R) shoulder PROM. TPR (R) periscapular mms. Rationale:      decrease pain, increase ROM, increase tissue extensibility and decrease trigger points to improve patient's ability to perform pain free ADLs. min Patient Education:  YES  Reviewed HEP   []  Progressed/Changed HEP based on: Other Objective/Functional Measures: Therex per flow sheet. Post Treatment Pain Level (on 0 to 10) scale:   0  / 10     ASSESSMENT  Assessment/Changes in Function:     Pt reporting that he is very happy with his ROM. Limited ability with (L) UE, modified exercises to decrease use/dependence on (L) UE.       []  See Progress Note/Recertification   Patient will continue to benefit from skilled PT services to modify and progress therapeutic interventions, address functional mobility deficits, address ROM deficits, address strength deficits, analyze and address soft tissue restrictions, analyze and cue movement patterns, analyze and modify body mechanics/ergonomics and assess and modify postural abnormalities to attain remaining goals. Progress toward goals / Updated goals:    Progressing toward LTG #3.       PLAN  [x]  Upgrade activities as tolerated YES Continue plan of care   []  Discharge due to :    []  Other:      Therapist: Angle Mcnair PTA    Date: 10/19/2017 Time: 5:06 PM     Future Appointments  Date Time Provider Mena Kumar   10/24/2017 4:30 PM Luis Henning, PT [facility]   10/26/2017 5:00 PM Jennifer Rutledge, PT [facility]   10/31/2017 5:00 PM Jennifer Rutledge PT [facility]   11/2/2017 5:00 PM Luigi Ott, PT [facility]   5/29/2018 8:15 AM Christy Granados  [address]

## 2017-10-24 ENCOUNTER — HOSPITAL ENCOUNTER (OUTPATIENT)
Dept: PHYSICAL THERAPY | Age: 62
Discharge: HOME | End: 2017-10-24
Payer: COMMERCIAL

## 2017-10-24 PROCEDURE — 97110 THERAPEUTIC EXERCISES: CPT

## 2017-10-24 PROCEDURE — 97140 MANUAL THERAPY 1/> REGIONS: CPT

## 2017-10-24 NOTE — PROGRESS NOTES
PHYSICAL THERAPY - DAILY TREATMENT NOTE    Patient Name: Isa Bermeo        Date: 10/24/2017  : 1955   YES Patient  Verified  Visit #:     Insurance: Payor: Christy Christina / Plan:  [address] PT / Product Type: Commerical /      In time: 4:30 Out time: 5:25   Total Treatment Time: 55     Medicare Time Tracking (below)   Total Timed Codes (min):  na 1:1 Treatment Time:  na     TREATMENT AREA =  Right shoulder pain [M25.511]    SUBJECTIVE  Pain Level (on 0 to 10 scale):  0  / 10   Medication Changes/New allergies or changes in medical history, any new surgeries or procedures? NO    If yes, update Summary List   Subjective Functional Status/Changes:  []  No changes reported     It feels pretty good. OBJECTIVE  Modalities Rationale:     decrease inflammation and decrease pain to improve patient's ability to perform self care activities.                                    min [] Estim, type/location:                                       []  att     []  unatt     []  w/US     []  w/ice    []  w/heat      min []  Mechanical Traction: type/lbs                     []  pro   []  sup   []  int   []  cont    []  before manual    []  after manual      min []  Ultrasound, settings/location:          min []  Iontophoresis w/ dexamethasone, location:                                                 []  take home patch       []  in clinic   10 min [x]  Ice     []  Heat    location/position: To R shoulder in long sit      min []  Vasopneumatic Device, press/temp:         min []  Other:      [x] Skin assessment post-treatment (if applicable):    [x]  intact    []  redness- no adverse reaction     []redness  adverse reaction:          30 min Therapeutic Exercise:  [x]  See flow sheet   Rationale:      increase ROM and increase strength to improve the patients ability to perform reaching activities.         15 min Manual Therapy: DTM pec, teres, GH mob, PROM in all planes Rationale:      decrease pain, increase ROM, increase tissue extensibility and decrease trigger points to improve patient's ability to perform carrying activities. min Patient Education:  YES  Reviewed HEP   []  Progressed/Changed HEP based on: Other Objective/Functional Measures:  FOTO = 64  Cont to be tight in ER/IR  PROM Flx ~150 deg   Post Treatment Pain Level (on 0 to 10) scale:   0  / 10     ASSESSMENT  Assessment/Changes in Function:     Good cristela to all Rx without increase in pain      []  See Progress Note/Recertification   Patient will continue to benefit from skilled PT services to modify and progress therapeutic interventions, address functional mobility deficits, address ROM deficits, address strength deficits, analyze and address soft tissue restrictions, analyze and cue movement patterns, analyze and modify body mechanics/ergonomics and assess and modify postural abnormalities to attain remaining goals.    Progress toward goals / Updated goals:    Slowly progressing with ROM      PLAN  []  Upgrade activities as tolerated YES Continue plan of care   []  Discharge due to :    []  Other:      Therapist: Praneeth Osorio, PT, OCS, SCS, CSCS    Date: 10/24/2017 Time: 12:21 PM       Future Appointments  Date Time Provider Mena Kumar   10/24/2017 4:30 PM Germain Berg PT [facility]   10/26/2017 5:00 PM Carmela Moore PT [facility]   10/31/2017 5:00 PM Carmela Moore PT [facility]   11/2/2017 5:00 PM Alycia Snyder, PT [facility]   5/29/2018 8:15 AM Mark Livingston MD [address] [facility]

## 2017-10-26 ENCOUNTER — HOSPITAL ENCOUNTER (OUTPATIENT)
Dept: PHYSICAL THERAPY | Age: 62
Discharge: HOME | End: 2017-10-26
Payer: COMMERCIAL

## 2017-10-26 PROCEDURE — 97110 THERAPEUTIC EXERCISES: CPT

## 2017-10-26 PROCEDURE — 97140 MANUAL THERAPY 1/> REGIONS: CPT

## 2017-10-26 NOTE — PROGRESS NOTES
PHYSICAL THERAPY - DAILY TREATMENT NOTE    Patient Name: Demetrio Oliver        Date: 10/26/2017  : 1955   YES Patient  Verified  Visit #:   15   of   24  Insurance: Payor: Unknown Ades / Plan: [address] PT / Product Type: Commerical /      In time: 458 Out time: 545   Total Treatment Time: 47     Medicare Time Tracking (below)   Total Timed Codes (min):  na 1:1 Treatment Time:  na     TREATMENT AREA =  Right shoulder pain [M25.511]    SUBJECTIVE  Pain Level (on 0 to 10 scale):  1.5  / 10   Medication Changes/New allergies or changes in medical history, any new surgeries or procedures? NO    If yes, update Summary List   Subjective Functional Status/Changes:  []  No changes reported     Patient states his shoulder is a little sore today which he relates to using it throughout the day. Patient denies significant complications since his LV. OBJECTIVE  Modalities Rationale:     decrease inflammation and decrease pain to improve patient's ability to perform self care activities. min [] Estim, type/location:                                      []  att     []  unatt     []  w/US     []  w/ice    []  w/heat    min []  Mechanical Traction: type/lbs                   []  pro   []  sup   []  int   []  cont    []  before manual    []  after manual    min []  Ultrasound, settings/location:      min []  Iontophoresis w/ dexamethasone, location:                                               []  take home patch       []  in clinic   10 min [x]  Ice     []  Heat    location/position: To R shoulder in long sit    min []  Vasopneumatic Device, press/temp:     min []  Other:    [x] Skin assessment post-treatment (if applicable):    [x]  intact    []  redness- no adverse reaction     []redness  adverse reaction:         27 min Therapeutic Exercise:  [x]  See flow sheet   Rationale:      increase ROM and increase strength to improve the patients ability to perform reaching activities.       10 min Manual Therapy: R Shoulder PROM all planes, STM to R posterior RC, R pec minor, R scar massage   Rationale:      decrease pain, increase ROM, increase tissue extensibility and decrease trigger points to improve patient's ability to perform carrying activities. min Patient Education:  YES  Reviewed HEP   []  Progressed/Changed HEP based on: Other Objective/Functional Measures: Added standing shoulder flexion AROM to 90 degrees this session for improved motion and ADL function. Good tolerance to PROM treatment, however continues to be limited with ER and IR     Post Treatment Pain Level (on 0 to 10) scale:   0  / 10     ASSESSMENT  Assessment/Changes in Function:     Patient tolerated today's session well. Patient appropriate to continue with AROM progression with future visits and when appropriate, initiate strength training. []  See Progress Note/Recertification   Patient will continue to benefit from skilled PT services to modify and progress therapeutic interventions, address functional mobility deficits, address ROM deficits, address strength deficits, analyze and address soft tissue restrictions, analyze and cue movement patterns, analyze and modify body mechanics/ergonomics and assess and modify postural abnormalities to attain remaining goals.    Progress toward goals / Updated goals:    Progressing well with LTG#3     PLAN  [x]  Upgrade activities as tolerated YES Continue plan of care   []  Discharge due to :    []  Other:      Therapist: Soheila Rodriguez PT    Date: 10/26/2017 Time: 6:51 PM     Future Appointments  Date Time Provider Mena Kumar   10/31/2017 5:00 PM Soheila Rodriguez PT [facility]   11/2/2017 5:00 PM [address] [place] [address]   11/7/2017 4:30 PM Lisset Smart PT [facility]   11/9/2017 8:00 AM Lisset Smart PT [facility]   11/14/2017 4:00 PM Lisset Smart PT [facility]   11/17/2017 3:30 PM Lisset Smart PT [facility]   11/21/2017 4:30 PM Murali Oliver [facility]   11/28/2017 5:00 PM Margarita Zaman, PT [facility]   11/30/2017 5:00 PM Fiona Still, PT [facility]   12/5/2017 5:00 PM Margarita Zaman, PT [facility]   12/7/2017 5:00 PM Fiona Still, PT [facility]   5/29/2018 8:15 AM Anika Osorio MD [address] [facility]

## 2017-10-31 ENCOUNTER — HOSPITAL ENCOUNTER (OUTPATIENT)
Dept: PHYSICAL THERAPY | Age: 62
Discharge: HOME | End: 2017-10-31
Payer: COMMERCIAL

## 2017-10-31 PROCEDURE — 97140 MANUAL THERAPY 1/> REGIONS: CPT

## 2017-10-31 PROCEDURE — 97110 THERAPEUTIC EXERCISES: CPT

## 2017-10-31 NOTE — PROGRESS NOTES
PHYSICAL THERAPY - DAILY TREATMENT NOTE    Patient Name: Matthew Hargrove        Date: 10/31/2017  : 1955   YES Patient  Verified  Visit #:     Insurance: Payor: Sharan Vital / Plan: [address] / Product Type: Commerical /      In time: 456 Out time: 552   Total Treatment Time: 56     Medicare Time Tracking (below)   Total Timed Codes (min):  na 1:1 Treatment Time:  na     TREATMENT AREA =  Right shoulder pain [M25.511]    SUBJECTIVE  Pain Level (on 0 to 10 scale):  2.5  / 10   Medication Changes/New allergies or changes in medical history, any new surgeries or procedures? NO    If yes, update Summary List   Subjective Functional Status/Changes:  []  No changes reported     Patient states he had a minor fall into his furniture at home, but did not fall completely on the floor. Patient states his whole body is sore and his ribs feel bruised, but denies any significant shoulder pain. OBJECTIVE  Modalities Rationale:     decrease inflammation and decrease pain to improve patient's ability to perform self care activities.     min [] Estim, type/location:                                      []  att     []  unatt     []  w/US     []  w/ice    []  w/heat    min []  Mechanical Traction: type/lbs                   []  pro   []  sup   []  int   []  cont    []  before manual    []  after manual    min []  Ultrasound, settings/location:      min []  Iontophoresis w/ dexamethasone, location:                                               []  take home patch       []  in clinic   10 min [x]  Ice     []  Heat    location/position: To R shoulder in long sit    min []  Vasopneumatic Device, press/temp:     min []  Other:    [x] Skin assessment post-treatment (if applicable):    [x]  intact    []  redness- no adverse reaction     []redness  adverse reaction:        36 min Therapeutic Exercise:  [x]  See flow sheet   Rationale:      increase ROM and increase strength to improve the patients ability to perform reaching activities. 10 min Manual Therapy: R shoulder PROM, STM to R posterior RC, R pec minor, R UT; Scar massage   Rationale:      decrease pain, increase ROM, increase tissue extensibility and decrease trigger points to improve patient's ability to perform carrying activities. min Patient Education:  YES  Reviewed HEP   []  Progressed/Changed HEP based on: Other Objective/Functional Measures: Added wall slides with FR this session for improved scapulohumeral rhythm and serratus anterior activity, patient performed with R UE only secondary to L shoulder pain  Progressed body blade to standing for improved R shoulder strength  Patient did not present with any complications for fall earlier in the week     Post Treatment Pain Level (on 0 to 10) scale:   2  / 10     ASSESSMENT  Assessment/Changes in Function:     Patient did not present with any complications related to fall, educated to contact MD if any complications arise. Patient progressing steadily with program at this time. []  See Progress Note/Recertification   Patient will continue to benefit from skilled PT services to modify and progress therapeutic interventions, address functional mobility deficits, address ROM deficits, address strength deficits, analyze and address soft tissue restrictions, analyze and cue movement patterns, analyze and modify body mechanics/ergonomics and assess and modify postural abnormalities to attain remaining goals.    Progress toward goals / Updated goals:    Progressing toward LTG#3     PLAN  [x]  Upgrade activities as tolerated YES Continue plan of care   []  Discharge due to :    []  Other:      Therapist: Sandi Olivares PT    Date: 10/31/2017 Time: 6:36 PM     Future Appointments  Date Time Provider Mena Kumar   11/2/2017 5:00 PM Peyton Walters PT [facility]   11/7/2017 4:30 PM Helaine Osgood, PT [facility]   11/9/2017 8:00 AM Helaine Osgood, PT [facility] 11/14/2017 4:00 PM Harsh Aquas, PT [facility]   11/17/2017 3:30 PM Harsh Aquas, PT [facility]   11/21/2017 4:30 PM Harsh Aquas, PT [facility]   11/28/2017 5:00 PM Harsh Aquas, PT [facility]   11/30/2017 5:00 PM Isac Alyshater, PT [facility]   12/5/2017 5:00 PM Harsh Aquas, PT [facility]   12/7/2017 5:00 PM Isac Crater, PT [facility]   5/29/2018 8:15 AM Rosalia Alanis MD [address] [place] [address]

## 2017-11-02 ENCOUNTER — HOSPITAL ENCOUNTER (OUTPATIENT)
Dept: PHYSICAL THERAPY | Age: 62
Discharge: HOME | End: 2017-11-02
Payer: COMMERCIAL

## 2017-11-02 PROCEDURE — 97140 MANUAL THERAPY 1/> REGIONS: CPT | Performed by: PHYSICAL THERAPIST

## 2017-11-02 PROCEDURE — 97110 THERAPEUTIC EXERCISES: CPT | Performed by: PHYSICAL THERAPIST

## 2017-11-02 NOTE — PROGRESS NOTES
Ankur Coffey   PHYSICAL THERAPY - DAILY TREATMENT NOTE    Patient Name: Belgica Cobos        Date: 2017  : 1955   yes Patient  Verified  Visit #:     Insurance: Payor: Kinjal Vasques / Plan: [address] / Product Type: Commerical /      In time: 506 Out time: 600   Total Treatment Time: 52     Medicare Time Tracking (below)   Total Timed Codes (min):  na 1:1 Treatment Time:  na     TREATMENT AREA =  Right shoulder pain [M25.511]    SUBJECTIVE  Pain Level (on 0 to 10 scale):  1.5  / 10   Medication Changes/New allergies or changes in medical history, any new surgeries or procedures?    no  If yes, update Summary List   Subjective Functional Status/Changes:  []  No changes reported     See pn          OBJECTIVE  Modalities Rationale:     decrease pain and increase tissue extensibility to improve patient's ability to elevate shoulder    min [] Estim, type/location:                                      []  att     []  unatt     []  w/US     []  w/ice    []  w/heat    min []  Mechanical Traction: type/lbs                   []  pro   []  sup   []  int   []  cont    []  before manual    []  after manual    min []  Ultrasound, settings/location:      min []  Iontophoresis w/ dexamethasone, location:                                               []  take home patch       []  in clinic   10 min [x]  Ice     []  Heat    location/position: Long sit    min []  Vasopneumatic Device, press/temp:     min []  Other:    [x] Skin assessment post-treatment (if applicable):    [x]  intact    []  redness- no adverse reaction     []redness  adverse reaction:        27 min Therapeutic Exercise:  [x]  See flow sheet   Rationale:      increase ROM and increase strength to improve the patients ability to elevate shoulder      15 min Manual Therapy: ghj prom all directions, pec and ut release,    Rationale:      decrease pain, increase ROM and increase tissue extensibility to improve patient's ability to complete adls         min Patient Education:  yes  Reviewed HEP   []  Progressed/Changed HEP based on: Other Objective/Functional Measures:    See pn     Post Treatment Pain Level (on 0 to 10) scale:  0  / 10     ASSESSMENT  Assessment/Changes in Function:     See pn     []  See Progress Note/Recertification   Patient will continue to benefit from skilled PT services to modify and progress therapeutic interventions, address functional mobility deficits, address ROM deficits, address strength deficits, analyze and address soft tissue restrictions, analyze and cue movement patterns, analyze and modify body mechanics/ergonomics, assess and modify postural abnormalities and instruct in home and community integration to attain remaining goals.    Progress toward goals / Updated goals:    See pn     PLAN  []  Upgrade activities as tolerated yes Continue plan of care   []  Discharge due to :    []  Other:      Therapist: Juanita Hernandez PT    Date: 11/2/2017 Time: 3:54 PM     Future Appointments  Date Time Provider Mena Kumar   11/2/2017 5:00 PM Juanita Hernandez PT [facility]   11/7/2017 4:30 PM Sonia Beal, PT [facility]   11/9/2017 8:00 AM Sonia Beal, PT [facility]   11/14/2017 4:00 PM Sonia Beal, PT [facility]   11/17/2017 3:30 PM Sonia Beal, PT [facility]   11/21/2017 4:30 PM Sonia Beal, PT [facility]   11/28/2017 5:00 PM Sonia Beal PT [facility]   11/30/2017 5:00 PM Xiao Newberry, PT [facility]   12/5/2017 5:00 PM Sonia Beal, PT [facility]   12/7/2017 5:00 PM Xiao Newberry, PT [facility]   5/29/2018 8:15 AM Preeti Nelson MD [address] [facility]

## 2017-11-03 NOTE — PROGRESS NOTES
.SACHA Kimball  PHYSICAL THERAPY  [address] [facility] [address],[facility], 70 [facility] - Phone: [phone]  Fax: [phone]  PROGRESS NOTE  Patient Name: Renee Logan : 1955   Treatment/Medical Diagnosis: Right shoulder pain [M25.511]   Referral Source: Dolores Ku     Date of Initial Visit: 17 Attended Visits: 17 Missed Visits: 1     SUMMARY OF TREATMENT  Pt was seen for IE and 16 f/u visits with treatment consisting of therapeutic exercises for shoulder rom/nico-scapular strengthening, manual therapy (prom/mobs/stm) and modalities prn. In addition pt was instructed on hep and activity modification. CURRENT STATUS  Pt has made good progress with PT thus far. PROM/AROM flex 85 (without hike)/149, scaption 76/110, er 38/55, IR GT. Now that he is at the 8 weeks post-op would like to continue with therapy to address shoulder weakness in all directions. Please advise. Goal/Measure of Progress Goal Met? 1. Pt will decrease daily max pain 50-75% to A with ADLS   Status at last Eval: 5/10 Current Status: 3/10 progressing   2. Pt will increase R shoulder flex/abd 140 to A with adls   Status at last Eval: Flex 143, [id number] Current Status: See above progressing   3. Status at last Eval:  Current Status:       New Goals to be achieved in __4__  weeks:  1. Achieve goal #1  2. Achieve goal #2  3. Pt will be bale to actively elevate shoulder 120 degrees to A with lifting/reaching  RECOMMENDATIONS  Continue an additional month with progression into next phase of protocol 2x/4 weeks  If you have any questions/comments please contact us directly at [phone]. Thank you for allowing us to assist in the care of your patient.     Therapist Signature: Terri Marroquin DPT, KARLIE  Date: 11/3/2017     Time: 9:14 AM   NOTE TO PHYSICIAN:  PLEASE COMPLETE THE ORDERS BELOW AND FAX TO   [facility]: [phone]  If you are unable to process this request in 24 hours please contact our office: [phone]    ___ I have read the above report and request that my patient continue as recommended.   ___ I have read the above report and request that my patient continue therapy with the following changes/special instructions:_________________________________________________________   ___ I have read the above report and request that my patient be discharged from therapy.      Physician Signature:        Date:       Time:

## 2017-11-07 ENCOUNTER — HOSPITAL ENCOUNTER (OUTPATIENT)
Dept: PHYSICAL THERAPY | Age: 62
Discharge: HOME | End: 2017-11-07
Payer: COMMERCIAL

## 2017-11-07 PROCEDURE — 97140 MANUAL THERAPY 1/> REGIONS: CPT

## 2017-11-07 PROCEDURE — 97110 THERAPEUTIC EXERCISES: CPT

## 2017-11-07 NOTE — PROGRESS NOTES
PHYSICAL THERAPY - DAILY TREATMENT NOTE    Patient Name: Johnathan Tierney        Date: 2017  : 1955   YES Patient  Verified  Visit #:   15   of   24  Insurance: Payor: Uzma Chery / Plan:  [employer] [address] PT / Product Type: Commerical /      In time: 4:30 Out time: 5:25   Total Treatment Time: 55     Medicare Time Tracking (below)   Total Timed Codes (min):  na 1:1 Treatment Time:  na     TREATMENT AREA =  Right shoulder pain [M25.511]    SUBJECTIVE  Pain Level (on 0 to 10 scale):  1  / 10   Medication Changes/New allergies or changes in medical history, any new surgeries or procedures? NO    If yes, update Summary List   Subjective Functional Status/Changes:  []  No changes reported     No new c/o          OBJECTIVE  Modalities Rationale:     decrease inflammation and decrease pain to improve patient's ability to perform self care activities.                                    min [] Estim, type/location:                                       []  att     []  unatt     []  w/US     []  w/ice    []  w/heat      min []  Mechanical Traction: type/lbs                     []  pro   []  sup   []  int   []  cont    []  before manual    []  after manual      min []  Ultrasound, settings/location:          min []  Iontophoresis w/ dexamethasone, location:                                                 []  take home patch       []  in clinic   10 min [x]  Ice     []  Heat    location/position: To R shoulder in long sit      min []  Vasopneumatic Device, press/temp:         min []  Other:      [x] Skin assessment post-treatment (if applicable):    [x]  intact    []  redness- no adverse reaction     []redness  adverse reaction:          30 min Therapeutic Exercise:  [x]  See flow sheet   Rationale:      increase ROM and increase strength to improve the patients ability to perform reaching activities.         15 min Manual Therapy: DTM pec, teres, GH mob, PROM in all planes   Rationale:      decrease pain, increase ROM, increase tissue extensibility and decrease trigger points to improve patient's ability to perform carrying activities. min Patient Education:  YES  Reviewed HEP   []  Progressed/Changed HEP based on: Other Objective/Functional Measures:    Cont to have increased soft tissue tension noted at R teres     Post Treatment Pain Level (on 0 to 10) scale:   0  / 10     ASSESSMENT  Assessment/Changes in Function:     Good cristela to all Rx without increase in pain      []  See Progress Note/Recertification   Patient will continue to benefit from skilled PT services to modify and progress therapeutic interventions, address functional mobility deficits, address ROM deficits, address strength deficits, analyze and address soft tissue restrictions, analyze and cue movement patterns, analyze and modify body mechanics/ergonomics and assess and modify postural abnormalities to attain remaining goals.    Progress toward goals / Updated goals:    Progressing slowly with ROM      PLAN  []  Upgrade activities as tolerated YES Continue plan of care   []  Discharge due to :    []  Other:      Therapist: Shoshana Amaro, PT, OCS, SCS, CSCS    Date: 11/7/2017 Time: 10:24 AM       Future Appointments  Date Time Provider Mena Kumar   11/7/2017 4:30 PM Johnathon Cedeno, PT [facility]   11/9/2017 8:00 AM Johnathon Cedeno, PT [facility]   11/14/2017 4:00 PM Johnathon Cedeno, PT [facility]   11/17/2017 3:30 PM Johnathon Cedeno, PT [facility]   11/21/2017 4:30 PM Johnathon Cedeno PT [facility]   11/28/2017 5:00 PM Johnathon Cedeno, PT [facility]   11/30/2017 5:00 PM Sujey Bergman, PT [facility]   12/5/2017 5:00 PM Johnathon Cedeno, PT [facility]   12/7/2017 5:00 PM Sujey Bergman, PT [facility]   5/29/2018 8:15 AM Curt Bonilla MD [facility]

## 2017-11-09 ENCOUNTER — HOSPITAL ENCOUNTER (OUTPATIENT)
Dept: PHYSICAL THERAPY | Age: 62
Discharge: HOME | End: 2017-11-09
Payer: COMMERCIAL

## 2017-11-09 PROCEDURE — 97140 MANUAL THERAPY 1/> REGIONS: CPT

## 2017-11-09 PROCEDURE — 97110 THERAPEUTIC EXERCISES: CPT

## 2017-11-09 NOTE — PROGRESS NOTES
PHYSICAL THERAPY - DAILY TREATMENT NOTE    Patient Name: Matthew Hargrove        Date: 2017  : 1955   YES Patient  Verified  Visit #:     Insurance: Payor: Sharan Vital / [facility]:  [address] [facility] / Product Type: Commerical /      In time: 8:05 Out time: 9:15   Total Treatment Time: 70     Medicare Time Tracking (below)   Total Timed Codes (min):  na 1:1 Treatment Time:  na     TREATMENT AREA =  Right shoulder pain [M25.511]    SUBJECTIVE  Pain Level (on 0 to 10 scale):  1  / 10   Medication Changes/New allergies or changes in medical history, any new surgeries or procedures? NO    If yes, update Summary List   Subjective Functional Status/Changes:  []  No changes reported     MD was happy with the progress. OBJECTIVE  Modalities Rationale:     decrease inflammation and decrease pain to improve patient's ability to perform self care activities.                                    min [] Estim, type/location:                                       []  att     []  unatt     []  w/US     []  w/ice    []  w/heat      min []  Mechanical Traction: type/lbs                     []  pro   []  sup   []  int   []  cont    []  before manual    []  after manual      min []  Ultrasound, settings/location:          min []  Iontophoresis w/ dexamethasone, location:                                                 []  take home patch       []  in clinic   10 min [x]  Ice     []  Heat    location/position: To R shoulder in long sit      min []  Vasopneumatic Device, press/temp:         min []  Other:      [x] Skin assessment post-treatment (if applicable):    [x]  intact    []  redness- no adverse reaction     []redness  adverse reaction:          45 min Therapeutic Exercise:  [x]  See flow sheet   Rationale:      increase ROM and increase strength to improve the patients ability to perform reaching activities.         15 min Manual Therapy: DTM pec, teres, GH mob, PROM in all planes Rationale:      decrease pain, increase ROM, increase tissue extensibility and decrease trigger points to improve patient's ability to perform carrying activities. min Patient Education:  YES  Reviewed HEP   []  Progressed/Changed HEP based on: Other Objective/Functional Measures:    Difficulty with prone horiz abd noted. Post Treatment Pain Level (on 0 to 10) scale:   1 / 10     ASSESSMENT  Assessment/Changes in Function:     Good cristela to all Rx without increase in pain      []  See Progress Note/Recertification   Patient will continue to benefit from skilled PT services to modify and progress therapeutic interventions, address functional mobility deficits, address ROM deficits, address strength deficits, analyze and address soft tissue restrictions, analyze and cue movement patterns, analyze and modify body mechanics/ergonomics and assess and modify postural abnormalities to attain remaining goals.    Progress toward goals / Updated goals:    Slowly progressing with function     PLAN  []  Upgrade activities as tolerated YES Continue plan of care   []  Discharge due to :    []  Other:      Therapist: Pamela Evangelista, PT, OCS, SCS, CSCS    Date: 11/9/2017 Time: 7:44 AM       Future Appointments  Date Time Provider Mena Kumar   11/9/2017 8:00 AM Taiwo Kingsley, PT [place] [address]   11/14/2017 4:00 PM Paullina Dryangela, PT [place] [address]   11/17/2017 3:30 PM Paullina Dryangela, PT [place] [address]   11/21/2017 4:30 PM Paullina Dryer, PT [place] [address]   11/28/2017 5:00 PM Taiwo Sancho, PT [place] [address]   11/30/2017 5:00 PM Justus Nichols PT [place] [address]   12/5/2017 5:00 PM Paullina Dryer, PT [place] [address]   12/7/2017 5:00 PM Justus Nichols PT [place] [address]   5/29/2018 8:15 AM Gosia Mckeon MD [address] [place] [address] Drive

## 2017-11-17 ENCOUNTER — HOSPITAL ENCOUNTER (OUTPATIENT)
Dept: PHYSICAL THERAPY | Age: 62
Discharge: HOME | End: 2017-11-17
Payer: COMMERCIAL

## 2017-11-17 PROCEDURE — 97140 MANUAL THERAPY 1/> REGIONS: CPT

## 2017-11-17 PROCEDURE — 97110 THERAPEUTIC EXERCISES: CPT

## 2017-11-17 NOTE — PROGRESS NOTES
PHYSICAL THERAPY - DAILY TREATMENT NOTE    Patient Name: Demetrio Oliver        Date: 2017  : 1955   YES Patient  Verified  Visit #:     Insurance: Payor: Unknown Ades / Plan: [address] PT / Product Type: Commerical /      In time: 3;30 Out time: 4:30   Total Treatment Time: 60     Medicare Time Tracking (below)   Total Timed Codes (min):  na 1:1 Treatment Time:  na     TREATMENT AREA =  Right shoulder pain [M25.511]    SUBJECTIVE  Pain Level (on 0 to 10 scale):  1  / 10   Medication Changes/New allergies or changes in medical history, any new surgeries or procedures? NO    If yes, update Summary List   Subjective Functional Status/Changes:  []  No changes reported     No new c/o          OBJECTIVE      45 min Therapeutic Exercise:  [x]  See flow sheet   Rationale:      increase ROM and increase strength to improve the patients ability to perform reaching activities.        15 min Manual Therapy: DTM pec, teres, GH mob, PROM in all planes   Rationale:      decrease pain, increase ROM, increase tissue extensibility and decrease trigger points to improve patient's ability to perform carrying activities. min Patient Education:  YES  Reviewed HEP   []  Progressed/Changed HEP based on: Other Objective/Functional Measures:    Cont to be be limited by ~20% in all directions     Post Treatment Pain Level (on 0 to 10) scale:    10     ASSESSMENT  Assessment/Changes in Function:     Good cristela to all Rx without increase in pain      []  See Progress Note/Recertification   Patient will continue to benefit from skilled PT services to modify and progress therapeutic interventions, address functional mobility deficits, address ROM deficits, address strength deficits, analyze and address soft tissue restrictions, analyze and cue movement patterns, analyze and modify body mechanics/ergonomics and assess and modify postural abnormalities to attain remaining goals.    Progress toward goals / Updated goals:    Slowly progressing with ROM      PLAN  []  Upgrade activities as tolerated YES Continue plan of care   []  Discharge due to :    []  Other:      Therapist: Dm Kumar, PT, OCS, SCS, CSCS    Date: 11/17/2017 Time: 4:26 PM       Future Appointments  Date Time Provider Mena Kumar   11/21/2017 4:30 PM Adam Kidd PT [facility]   11/28/2017 5:00 PM Adam Kidd PT [facility]   11/30/2017 5:00 PM Mariella Martinez PT [facility]   12/5/2017 5:00 PM Adam Kidd PT [facility]   12/7/2017 5:00 PM Mariella Martinez PT [facility]   5/29/2018 8:15 AM Fany Kennedy MD [address] [facility]

## 2017-11-21 ENCOUNTER — HOSPITAL ENCOUNTER (OUTPATIENT)
Dept: PHYSICAL THERAPY | Age: 62
Discharge: HOME | End: 2017-11-21
Payer: COMMERCIAL

## 2017-11-21 PROCEDURE — 97140 MANUAL THERAPY 1/> REGIONS: CPT

## 2017-11-21 PROCEDURE — 97110 THERAPEUTIC EXERCISES: CPT

## 2017-11-21 NOTE — PROGRESS NOTES
PHYSICAL THERAPY - DAILY TREATMENT NOTE    Patient Name: Demetrio Oliver        Date: 2017  : 1955   YES Patient  Verified  Visit #:     Insurance: Payor: Unknown Ades / Plan: [address] / Product Type: Commerical /      In time: 4:42 Out time: 5:45   Total Treatment Time: 63     Medicare Time Tracking (below)   Total Timed Codes (min):  na 1:1 Treatment Time:  na     TREATMENT AREA =  Right shoulder pain [M25.511]    SUBJECTIVE  Pain Level (on 0 to 10 scale):  1  / 10   Medication Changes/New allergies or changes in medical history, any new surgeries or procedures? NO    If yes, update Summary List   Subjective Functional Status/Changes:  []  No changes reported     No new c/o          OBJECTIVE  45 min Therapeutic Exercise:  [x]  See flow sheet   Rationale:      increase ROM and increase strength to improve the patients ability to perform reaching activities.        8 min Manual Therapy: DTM pec, teres, GH mob, PROM in all planes   Rationale:      decrease pain, increase ROM, increase tissue extensibility and decrease trigger points to improve patient's ability to perform carrying activities. min Patient Education:  YES  Reviewed HEP   []  Progressed/Changed HEP based on: Other Objective/Functional Measures:    FOTO = 62  Cont to have difficulty with prone horiz abd     Post Treatment Pain Level (on 0 to 10) scale:   0  / 10     ASSESSMENT  Assessment/Changes in Function:     Good cristela to all Rx without increase in pain      []  See Progress Note/Recertification   Patient will continue to benefit from skilled PT services to modify and progress therapeutic interventions, address functional mobility deficits, address ROM deficits, address strength deficits, analyze and address soft tissue restrictions, analyze and cue movement patterns, analyze and modify body mechanics/ergonomics and assess and modify postural abnormalities to attain remaining goals.    Progress toward goals / Updated goals:    Slow progress with ROM     PLAN  []  Upgrade activities as tolerated YES Continue plan of care   []  Discharge due to :    []  Other:      Therapist: Maria Eugenia Lamar, PT, OCS, SCS, CSCS    Date: 11/21/2017 Time: 2:49 PM       Future Appointments  Date Time Provider Mena Kumar   11/21/2017 4:30 PM Alvarado Garcia, PT [facility]   11/28/2017 5:00 PM Alvarado Garcia PT [facility]   11/30/2017 5:00 PM Jonah Lopez PT [facility]   12/5/2017 5:00 PM Alvarado Garcia PT [facility]   12/7/2017 5:00 PM Jonah Lopez, PT [facility]   5/29/2018 8:15 AM Marvin Gutierrez MD [address] [facility]

## 2017-11-28 ENCOUNTER — HOSPITAL ENCOUNTER (OUTPATIENT)
Dept: PHYSICAL THERAPY | Age: 62
Discharge: HOME | End: 2017-11-28
Payer: COMMERCIAL

## 2017-11-28 PROCEDURE — 97140 MANUAL THERAPY 1/> REGIONS: CPT

## 2017-11-28 PROCEDURE — 97110 THERAPEUTIC EXERCISES: CPT

## 2017-11-28 NOTE — PROGRESS NOTES
PHYSICAL THERAPY - DAILY TREATMENT NOTE    Patient Name: Kina Armendariz        Date: 2017  : 1955   YES Patient  Verified  Visit #:     Insurance: Payor: Vicky Luu / [facility]:  [address] [facility] / Product Type: Commerical /      In time: 5:10 Out time: 6:06   Total Treatment Time: 56     Medicare Time Tracking (below)   Total Timed Codes (min):  na 1:1 Treatment Time:  na     TREATMENT AREA =  Right shoulder pain [M25.511]    SUBJECTIVE  Pain Level (on 0 to 10 scale):   10   Medication Changes/New allergies or changes in medical history, any new surgeries or procedures? NO    If yes, update Summary List   Subjective Functional Status/Changes:  []  No changes reported     I dont really have pain with forward flx anymore. OBJECTIVE  46 min Therapeutic Exercise:  [x]  See flow sheet   Rationale:      increase ROM and increase strength to improve the patients ability to perform reaching activities.        10 min Manual Therapy: DTM pec, teres, GH mob, PROM in all planes   Rationale:      decrease pain, increase ROM, increase tissue extensibility and decrease trigger points to improve patient's ability to perform carrying activities. min Patient Education:  YES  Reviewed HEP   []  Progressed/Changed HEP based on: Other Objective/Functional Measures:    Near full flx and ER PROM noted.       Post Treatment Pain Level (on 0 to 10) scale:   1  / 10     ASSESSMENT  Assessment/Changes in Function:     Good cristela to all Rx without increase in pain      []  See Progress Note/Recertification   Patient will continue to benefit from skilled PT services to modify and progress therapeutic interventions, address functional mobility deficits, address ROM deficits, address strength deficits, analyze and address soft tissue restrictions, analyze and cue movement patterns, analyze and modify body mechanics/ergonomics and assess and modify postural abnormalities to attain remaining goals.    Progress toward goals / Updated goals:    Progressing well with ROM      PLAN  []  Upgrade activities as tolerated YES Continue plan of care   []  Discharge due to :    []  Other:      Therapist: Maria Eugenia Lamar, PT, OCS, SCS, CSCS    Date: 11/28/2017 Time: 11:27 AM       Future Appointments  Date Time Provider Mena Kumar   11/28/2017 5:00 PM Alvarado Garcia, PT [place] [address]   11/30/2017 5:00 PM Jonah Lopez, PT [place] [address]   12/5/2017 5:00 PM Alvarado Garcia, PT [place] [address]   12/7/2017 5:00 PM Jonah Lopez, PT [place] [address]   5/29/2018 8:15 AM Marvin Gutierrez MD [address] [facility]

## 2017-11-30 ENCOUNTER — HOSPITAL ENCOUNTER (OUTPATIENT)
Dept: PHYSICAL THERAPY | Age: 62
Discharge: HOME | End: 2017-11-30
Payer: COMMERCIAL

## 2017-11-30 PROCEDURE — 97110 THERAPEUTIC EXERCISES: CPT

## 2017-11-30 PROCEDURE — 97140 MANUAL THERAPY 1/> REGIONS: CPT

## 2017-11-30 NOTE — PROGRESS NOTES
PHYSICAL THERAPY - DAILY TREATMENT NOTE    Patient Name: Renee Logan        Date: 2017  : 1955   YES Patient  Verified  Visit #:     Insurance: Payor: Chris Brown / Plan: [address] / Product Type: Commerical /      In time: 505 Out time: 557   Total Treatment Time: 52     Medicare Time Tracking (below)   Total Timed Codes (min):  na 1:1 Treatment Time:  na     TREATMENT AREA =  Right shoulder pain [M25.511]    SUBJECTIVE  Pain Level (on 0 to 10 scale):  1  / 10   Medication Changes/New allergies or changes in medical history, any new surgeries or procedures? NO    If yes, update Summary List   Subjective Functional Status/Changes:  []  No changes reported     Patient reports his shoulder is doing well, denies complications since his LV. OBJECTIVE  Modalities Rationale:     decrease inflammation and decrease pain to improve patient's ability to perform self care activities. min [] Estim, type/location:                                      []  att     []  unatt     []  w/US     []  w/ice    []  w/heat    min []  Mechanical Traction: type/lbs                   []  pro   []  sup   []  int   []  cont    []  before manual    []  after manual    min []  Ultrasound, settings/location:      min []  Iontophoresis w/ dexamethasone, location:                                               []  take home patch       []  in clinic   10 min [x]  Ice     []  Heat    location/position: To R shoulder in long sit    min []  Vasopneumatic Device, press/temp:     min []  Other:    [x] Skin assessment post-treatment (if applicable):    [x]  intact    []  redness- no adverse reaction     []redness  adverse reaction:        32 min Therapeutic Exercise:  [x]  See flow sheet   Rationale:      increase ROM and increase strength to improve the patients ability to perform reaching activities.       10 min Manual Therapy: STM to R pec minor, R posterior RC, R anterior deltoid, R shoulder PROM   Rationale:      decrease pain, increase ROM, increase tissue extensibility and decrease trigger points to improve patient's ability to perform carrying activities. min Patient Education:  YES  Reviewed HEP   []  Progressed/Changed HEP based on: Other Objective/Functional Measures:    Good tolerance to current TE program, demonstrates gross improvement in R shoulder AAROM  PROM for flexion and ABD WNL, minor restriction to ER this session     Post Treatment Pain Level (on 0 to 10) scale:   0  / 10     ASSESSMENT  Assessment/Changes in Function:     Patient reported reduced symptoms post session. Patient progressing very well with current program at this time. []  See Progress Note/Recertification   Patient will continue to benefit from skilled PT services to modify and progress therapeutic interventions, address functional mobility deficits, address ROM deficits, address strength deficits, analyze and address soft tissue restrictions, analyze and cue movement patterns, analyze and modify body mechanics/ergonomics and assess and modify postural abnormalities to attain remaining goals.    Progress toward goals / Updated goals:    Progressing well with new LTG#3     PLAN  [x]  Upgrade activities as tolerated YES Continue plan of care   []  Discharge due to :    []  Other:      Therapist: Kia Angeles, PT    Date: 11/30/2017 Time: 5:56 PM     Future Appointments  Date Time Provider Mena Kumar   12/5/2017 5:00 PM Abdoulaye Selby PT [facility]   12/7/2017 5:00 PM Kia nAgeles, PT [facility]   5/29/2018 8:15 AM Gabriel Mckeon MD [address] [place] [address]

## 2017-12-05 ENCOUNTER — HOSPITAL ENCOUNTER (OUTPATIENT)
Dept: PHYSICAL THERAPY | Age: 62
Discharge: HOME | End: 2017-12-05
Payer: COMMERCIAL

## 2017-12-05 PROCEDURE — 97110 THERAPEUTIC EXERCISES: CPT

## 2017-12-05 PROCEDURE — 97140 MANUAL THERAPY 1/> REGIONS: CPT

## 2017-12-05 NOTE — PROGRESS NOTES
[address] PHYSICAL THERAPY  [address] [place] [address], [place] [address],[facility], 70 [facility] - Phone: [phone]  Fax: [phone] SUMMARY  Patient Name: Demetrio Oliver : 1955   Treatment/Medical Diagnosis: Right shoulder pain [M25.511]   Referral Source: Merari Barber     Date of Initial Visit: 17 Attended Visits: 24 Missed Visits: 2     SUMMARY OF TREATMENT  Demetrio Oliver has been seen at our clinic 2-3x/wk for a total of 24 visits. Pt treatment has consisted of  therapeutic exercise for shoulder ROM, shoulder/scapular stability, and manual therapy (jt mobilization and deep tissue mobilization)  CURRENT STATUS  Pt has had a good tolerance to physical therapy treatment. He reports significant improvement with his pain and ability to reach. Most recent measures are as follows: R Shoulder AROM: Flx: 137 deg (was 85 deg on 11/3/17), Scaption: 156 deg (was 110 on 17. Manual Muscle Test: IR/ER = Normal.  Flx = Reduced. Due to limited number of visits authorized by his insurance. He has decided to save the visits for his L shoulder surgery. Therefore, we are discharging Mr. Ramon Andrews from PT treatment at this time. Nahed Brandon of Progress Goal Met? 1. Decrease Max pain by 50-75% to assist with ADLs   Status at last Eval: 3/10 on 11/3/17. Current Status: 0/10 yes   2. Pt will be bale to actively elevate shoulder 120 degrees to A with lifting/reaching   Status at last Eval: See above Current Status: See above yes     RECOMMENDATIONS  Discharge from physical therapy treatment with HEP. If you have any questions/comments please contact us directly at [phone]. Thank you for allowing us to assist in the care of your patient.     Therapist Signature: Emigdio De León, CYNDI, OCS, SCS, CSCS Date: 2017     Time: 6:29 PM

## 2017-12-05 NOTE — PROGRESS NOTES
PHYSICAL THERAPY - DAILY TREATMENT NOTE    Patient Name: Kina Armendariz        Date: 2017  : 1955   YES Patient  Verified  Visit #:     Insurance: Payor: Vicky Luu / [facility]:  [facility] [address] [facility] / Product Type: Commerical /      In time: 4:50 Out time: 5:58   Total Treatment Time: 68     Medicare Time Tracking (below)   Total Timed Codes (min):  na 1:1 Treatment Time:  na     TREATMENT AREA =  Right shoulder pain [M25.511]    SUBJECTIVE  Pain Level (on 0 to 10 scale):  0  / 10   Medication Changes/New allergies or changes in medical history, any new surgeries or procedures?     NO    If yes, update Summary List   Subjective Functional Status/Changes:  []  No changes reported     No pain with all activity          OBJECTIVE  Modalities Rationale:     decrease inflammation and decrease pain to improve patient's ability to perform self care activities.                 min [] Estim, type/location:                                       []  att     []  unatt     []  w/US     []  w/ice    []  w/heat     min []  Mechanical Traction: type/lbs                    []  pro   []  sup   []  int   []  cont    []  before manual    []  after manual     min []  Ultrasound, settings/location:        min []  Iontophoresis w/ dexamethasone, location:                                                []  take home patch       []  in clinic   10 min [x]  Ice     []  Heat    location/position: To R shoulder in long sit     min []  Vasopneumatic Device, press/temp:       min []  Other:     [x] Skin assessment post-treatment (if applicable):    [x]  intact    []  redness- no adverse reaction     []redness  adverse reaction:         38 min Therapeutic Exercise:  [x]  See flow sheet   Rationale:      increase ROM and increase strength to improve the patients ability to perform reaching activities.       20 min Manual Therapy: STM to R pec minor, R posterior RC, R anterior deltoid, R shoulder PROM   Rationale: decrease pain, increase ROM, increase tissue extensibility and decrease trigger points to improve patient's ability to perform carrying activities.         min Patient Education:  YES  Reviewed HEP   []  Progressed/Changed HEP based on: Other Objective/Functional Measures:    AROM Flx = 137, Scaption = 156  FOTO = 69    MMT: ER and IR = Normal.  Flx = Reduced.     Post Treatment Pain Level (on 0 to 10) scale:   0  / 10     ASSESSMENT  Assessment/Changes in Function:     Independent with all Ex     []  See Progress Note/Recertification      Progress toward goals / Updated goals:    See DC     PLAN  []  Upgrade activities as tolerated  Continue plan of care   [x]  Discharge due to : Met goals   []  Other:      Therapist: Paddy Arredondo, PT, OCS, SCS, CSCS    Date: 12/5/2017 Time: 2:48 PM       Future Appointments  Date Time Provider Mena Kumar   12/5/2017 5:00 PM Ana Real PT [facility]   12/7/2017 5:00 PM [facility]   12/12/2017 4:30 PM Ana Real PT [facility]   12/13/2017 4:00 PM Yaritza Foster PTA [facility]   12/19/2017 4:00 PM Yaritza Foster PTA [facility]   12/21/2017 2:30 PM Yaritza Foster [facility]   5/29/2018 8:15 AM Erick Corrigan MD [id number]

## 2017-12-07 ENCOUNTER — APPOINTMENT (OUTPATIENT)
Dept: PHYSICAL THERAPY | Age: 62
End: 2017-12-07
Payer: COMMERCIAL

## 2017-12-12 ENCOUNTER — APPOINTMENT (OUTPATIENT)
Dept: PHYSICAL THERAPY | Age: 62
End: 2017-12-12
Payer: COMMERCIAL

## 2017-12-13 ENCOUNTER — APPOINTMENT (OUTPATIENT)
Dept: PHYSICAL THERAPY | Age: 62
End: 2017-12-13
Payer: COMMERCIAL

## 2017-12-19 ENCOUNTER — APPOINTMENT (OUTPATIENT)
Dept: PHYSICAL THERAPY | Age: 62
End: 2017-12-19
Payer: COMMERCIAL

## 2017-12-21 ENCOUNTER — APPOINTMENT (OUTPATIENT)
Dept: PHYSICAL THERAPY | Age: 62
End: 2017-12-21
Payer: COMMERCIAL

## 2018-03-01 ENCOUNTER — HOSPITAL ENCOUNTER (OUTPATIENT)
Dept: PHYSICAL THERAPY | Age: 63
Discharge: HOME | End: 2018-03-01
Payer: COMMERCIAL

## 2018-03-01 PROCEDURE — 97535 SELF CARE MNGMENT TRAINING: CPT

## 2018-03-01 PROCEDURE — 97162 PT EVAL MOD COMPLEX 30 MIN: CPT

## 2018-03-01 PROCEDURE — 97140 MANUAL THERAPY 1/> REGIONS: CPT

## 2018-03-01 NOTE — PROGRESS NOTES
[facility] PHYSICAL THERAPY  [address] [place] [address] 201,Jazmin Coelho, [address] [facility] - Phone: [phone]  Fax: [phone] / [address] [facility]  Patient Name: Regina Ponce : 1955   Medical   Diagnosis: Left shoulder pain [M25.512] Treatment Diagnosis: Left shoulder pain [M25.512]   Onset Date: 18     Referral Source: [facility] Start of Care [facility]): 3/1/2018   Prior Hospitalization: See medical history Provider #: [id number]   Prior Level of Function: Difficulty with reaching and lifting   Comorbidities: OA   Medications: Verified on Patient Summary List   The Plan of Care and following information is based on the information from the initial evaluation.   ===========================================================================================  Assessment / key information:  Neisha Wilson is a 65 y.o.  yo male with Dx of Left shoulder pain [M25.512]. He reports having L TSA on 18. He currently rates his pain as 9/10 at worst, 3/10 at best, primarily located at anterior aspect of his L shoulder. Objective Findings:  L Shoulder PROM: Flx: 90 deg, Abd: 90 deg. Pt instructed in [facility] and will f/u in clinic for PT.  ===========================================================================================  Eval Complexity: History MEDIUM  Complexity : 1-2 comorbidities / personal factors will impact the outcome/ POC ;  Examination  MEDIUM Complexity : 3 Standardized tests and measures addressing body structure, function, activity limitation and / or participation in recreation ; Presentation MEDIUM Complexity : Evolving with changing characteristics ;   Decision Making MEDIUM Complexity : FOTO score of 26-74; Overall Complexity MEDIUM  Problem List: pain affecting function, decrease ROM, decrease strength, decrease ADL/ functional abilitiies, decrease activity tolerance and decrease flexibility/ joint mobility   Treatment Plan may include any combination of the following: Therapeutic exercise, Therapeutic activities, Neuromuscular re-education, Physical agent/modality, Manual therapy, Patient education, Self Care training and Functional mobility training  Patient / Family readiness to learn indicated by: asking questions, trying to perform skills and interest  Persons(s) to be included in education: patient (P)  Barriers to Learning/Limitations: no  Measures taken: FOTO = 38%   Patient Goal (s): Regain the use of L shoulder   Patient self reported health status: good  Rehabilitation Potential: good   Short Term Goals: To be accomplished in  1-2  weeks:  1. Independent with HEP. 2. Decrease max pain 25-50% to assist with reaching and lifting   Long Term Goals: To be accomplished in  3-4  weeks:  1. Decrease max pain 50-75% to assist with reaching and lifting  2. Increase FOTO score to 45% to show functional improvment  3. Increase L shoulder PROM to 140 deg to assist with ADLs. Frequency / Duration:   Patient to be seen  2-3  times per week for 3-4  weeks:  Patient / Caregiver education and instruction: self care and exercises    Therapist Signature: Destin John, CYNDI, OCS, SCS, CSCS Date: 4/6/0224   Certification Period: na Time: 11:56 AM   =================================================================================I certify that the above Physical Therapy Services are being furnished while the patient is under my care. I agree with the treatment plan and certify that this therapy is necessary. Physician Signature:        Date:       Time:     Please sign and return to [facility] or you may fax the signed copy to [phone]. Thank you.

## 2018-03-08 ENCOUNTER — HOSPITAL ENCOUNTER (OUTPATIENT)
Dept: PHYSICAL THERAPY | Age: 63
Discharge: HOME | End: 2018-03-08
Payer: COMMERCIAL

## 2018-03-08 PROCEDURE — 97110 THERAPEUTIC EXERCISES: CPT | Performed by: PHYSICAL THERAPIST

## 2018-03-08 PROCEDURE — 97140 MANUAL THERAPY 1/> REGIONS: CPT | Performed by: PHYSICAL THERAPIST

## 2018-03-08 NOTE — PROGRESS NOTES
..   PHYSICAL THERAPY - DAILY TREATMENT NOTE    Patient Name: Miriam Kennedy        Date: 3/8/2018  : 1955   yes Patient  Verified  Visit #:     Insurance: Payor: Reyes Singer / Plan: [address] PT / Product Type: Commerical /      In time: 331 Out time: 413   Total Treatment Time: 40     Medicare Time Tracking (below)   Total Timed Codes (min):  na 1:1 Treatment Time:  na     TREATMENT AREA =  Left shoulder pain [M25.512]    SUBJECTIVE  Pain Level (on 0 to 10 scale):  5  / 10   Medication Changes/New allergies or changes in medical history, any new surgeries or procedures?    no  If yes, update Summary List   Subjective Functional Status/Changes:  []  No changes reported     This shoulder feels so much better than the right one did          OBJECTIVE  Modalities Rationale:     decrease inflammation, decrease pain and increase tissue extensibility to improve patient's ability to don/doff sling   min [] Estim, type/location:                                      []  att     []  unatt     []  w/US     []  w/ice    []  w/heat    min []  Mechanical Traction: type/lbs                   []  pro   []  sup   []  int   []  cont    []  before manual    []  after manual    min []  Ultrasound, settings/location:      min []  Iontophoresis w/ dexamethasone, location:                                               []  take home patch       []  in clinic   10 min [x]  Ice     []  Heat    location/position: Supine with bolster    min []  Vasopneumatic Device, press/temp:     min []  Other:    [x] Skin assessment post-treatment (if applicable):    [x]  intact    []  redness- no adverse reaction     []redness  adverse reaction:        12 min Therapeutic Exercise:  [x]  See flow sheet   Rationale:      increase ROM and increase strength to improve the patients ability to don/doff sling     18 min Manual Therapy: ghj prom all directions, stm distal biceps/posterior cuff   Rationale:      decrease pain, increase ROM, increase tissue extensibility and decrease trigger points to improve patient's ability to don/doff sling         min Patient Education:  yes  Reviewed HEP   []  Progressed/Changed HEP based on: Other Objective/Functional Measures:    Prom end range stopped due to post op protocol vs pt pain/guarding  Performed te as per flow sheet with min cues required on form      Post Treatment Pain Level (on 0 to 10) scale:   4 / 10     ASSESSMENT  Assessment/Changes in Function:     No increase in pain following initial te session, demo verbal understanding of post op protocol     []  See Progress Note/Recertification   Patient will continue to benefit from skilled PT services to modify and progress therapeutic interventions, address functional mobility deficits, address ROM deficits, address strength deficits, analyze and address soft tissue restrictions, analyze and cue movement patterns, analyze and modify body mechanics/ergonomics, assess and modify postural abnormalities and instruct in home and community integration to attain remaining goals.    Progress toward goals / Updated goals:    Compliant with hep     PLAN  []  Upgrade activities as tolerated yes Continue plan of care   []  Discharge due to :    []  Other:      Therapist: Dilshad Leahy PT    Date: 3/8/2018 Time: 4:16 PM     Future Appointments  Date Time Provider Mena Kumar   3/13/2018 4:00 PM Shivani Goode PTA [facility]   3/22/2018 3:30 PM Shivani Goode PTA [facility]   3/29/2018 3:30 PM Shivani Goode PTA [facility]   5/29/2018 8:15 AM Jeanne [facility]asencia MD [facility]

## 2018-03-13 ENCOUNTER — APPOINTMENT (OUTPATIENT)
Dept: PHYSICAL THERAPY | Age: 63
End: 2018-03-13
Payer: COMMERCIAL

## 2018-03-20 ENCOUNTER — HOSPITAL ENCOUNTER (OUTPATIENT)
Dept: PHYSICAL THERAPY | Age: 63
Discharge: HOME | End: 2018-03-20
Payer: COMMERCIAL

## 2018-03-20 PROCEDURE — 97140 MANUAL THERAPY 1/> REGIONS: CPT

## 2018-03-20 PROCEDURE — 97110 THERAPEUTIC EXERCISES: CPT

## 2018-03-20 NOTE — PROGRESS NOTES
PHYSICAL THERAPY - DAILY TREATMENT NOTE    Patient Name: Marilou Real        Date: 3/20/2018  : 1955   YES Patient  Verified  Visit #:   3   of   12  Insurance: Payor: Sandeep Ruvalcaba / Plan: [address] PT / Product Type: Commerical /      In time: 4 Out time: 440   Total Treatment Time: 40     Medicare Time Tracking (below)   Total Timed Codes (min):  30 1:1 Treatment Time:       TREATMENT AREA =  Left shoulder pain [M25.512]    SUBJECTIVE  Pain Level (on 0 to 10 scale):  4  / 10   Medication Changes/New allergies or changes in medical history, any new surgeries or procedures? NO    If yes, update Summary List   Subjective Functional Status/Changes:  []  No changes reported     \"I forgot to bring the paper in today, but I saw the doctor last week and they removed the staples, said I could raise it to 110 degrees. \"        OBJECTIVE  Modalities Rationale:     decrease inflammation and decrease pain to improve patient's ability to perform pain free ADLs. min [] Estim, type/location:                                      []  att     []  unatt     []  w/US     []  w/ice    []  w/heat    min []  Mechanical Traction: type/lbs                   []  pro   []  sup   []  int   []  cont    []  before manual    []  after manual    min []  Ultrasound, settings/location:      min []  Iontophoresis w/ dexamethasone, location:                                               []  take home patch       []  in clinic   10 min [x]  Ice     []  Heat    location/position: Supine, (L) shoulder. min []  Vasopneumatic Device, press/temp:     min []  Other:    [x] Skin assessment post-treatment (if applicable):    [x]  intact    []  redness- no adverse reaction     []redness  adverse reaction:        15 min Therapeutic Exercise:  [x]  See flow sheet   Rationale:      increase ROM, increase strength and improve coordination to improve the patients ability to perform pain free ADLs.       15 Min Manual Therapy: (L) shoulder PROM. Rationale:      decrease pain, increase ROM, increase tissue extensibility and decrease trigger points to improve patient's ability to perform pain free ADLs. min Patient Education:  YES  Reviewed HEP   []  Progressed/Changed HEP based on: Other Objective/Functional Measures:    (L) shoulder PROM flexion to 110 degrees, no restriction or end range pain. Post Treatment Pain Level (on 0 to 10) scale:   2  / 10     ASSESSMENT  Assessment/Changes in Function:     Good tolerance to increase in ROM. []  See Progress Note/Recertification   Patient will continue to benefit from skilled PT services to modify and progress therapeutic interventions, address functional mobility deficits, address ROM deficits, address strength deficits, analyze and address soft tissue restrictions, analyze and cue movement patterns, analyze and modify body mechanics/ergonomics and assess and modify postural abnormalities to attain remaining goals. Progress toward goals / Updated goals:    Progressing toward ROM goals.       PLAN  [x]  Upgrade activities as tolerated YES Continue plan of care   []  Discharge due to :    []  Other:      Therapist: Gregoria Rios PTA    Date: 3/20/2018 Time: 3:58 PM     Future Appointments  Date Time Provider Mena Kumar   3/20/2018 4:00 PM [facility]   3/22/2018 3:30 PM [facility]   3/29/2018 3:30 PM [facility]   5/29/2018 8:15 AM Delilah Muñoz MD [id number]

## 2018-03-27 ENCOUNTER — APPOINTMENT (OUTPATIENT)
Dept: PHYSICAL THERAPY | Age: 63
End: 2018-03-27
Payer: COMMERCIAL

## 2018-03-29 ENCOUNTER — APPOINTMENT (OUTPATIENT)
Dept: PHYSICAL THERAPY | Age: 63
End: 2018-03-29
Payer: COMMERCIAL

## 2018-04-03 ENCOUNTER — HOSPITAL ENCOUNTER (OUTPATIENT)
Dept: PHYSICAL THERAPY | Age: 63
Discharge: HOME | End: 2018-04-03
Payer: COMMERCIAL

## 2018-04-03 PROCEDURE — 97140 MANUAL THERAPY 1/> REGIONS: CPT

## 2018-04-03 PROCEDURE — 97110 THERAPEUTIC EXERCISES: CPT

## 2018-04-03 NOTE — PROGRESS NOTES
PHYSICAL THERAPY - DAILY TREATMENT NOTE    Patient Name: Kellie Taylor        Date: 4/3/2018  : 1955   YES Patient  Verified  Visit #:     Insurance: Payor: Leeann Box / Plan: [address] PT / Product Type: Commerical /      In time: 405 Out time: 445   Total Treatment Time: 40     Medicare Time Tracking (below)   Total Timed Codes (min):  30 1:1 Treatment Time:       TREATMENT AREA =  Left shoulder pain [M25.512]    SUBJECTIVE  Pain Level (on 0 to 10 scale):  2  / 10   Medication Changes/New allergies or changes in medical history, any new surgeries or procedures? NO    If yes, update Summary List   Subjective Functional Status/Changes:  []  No changes reported     Pt states he was working on a Saturday when he had to catch an object that was falling. Spoke with MD who told him to take a week off from PT to rest the shoulder. Recent max pain 6/10, residual pain from the accident at work. States he had less overall pain before that happened. OBJECTIVE  Modalities Rationale:     decrease inflammation and decrease pain to improve patient's ability to perform pain free ADLs. min [] Estim, type/location:                                      []  att     []  unatt     []  w/US     []  w/ice    []  w/heat    min []  Mechanical Traction: type/lbs                   []  pro   []  sup   []  int   []  cont    []  before manual    []  after manual    min []  Ultrasound, settings/location:      min []  Iontophoresis w/ dexamethasone, location:                                               []  take home patch       []  in clinic   10 min [x]  Ice     []  Heat    location/position: Supine, (L) shoulder.      min []  Vasopneumatic Device, press/temp:     min []  Other:    [x] Skin assessment post-treatment (if applicable):    [x]  intact    []  redness- no adverse reaction     []redness  adverse reaction:        15 min Therapeutic Exercise:  [x]  See flow sheet   Rationale: increase ROM, increase strength and improve coordination to improve the patients ability to perform pain free ADLs. 15 Min Manual Therapy: (L) shoulder PROM. TPR (L) periscapular mms. Rationale:      decrease pain, increase ROM, increase tissue extensibility and decrease trigger points to improve patient's ability to perform pain free ADLs. min Patient Education:  YES  Reviewed HEP   []  Progressed/Changed HEP based on: Other Objective/Functional Measures: Therex per flow sheet. PROM Flex = 137 deg     FOTO = 59  GROC = +2      Post Treatment Pain Level (on 0 to 10) scale:   3-4  / 10     ASSESSMENT  Assessment/Changes in Function:     See PN     []  See Progress Note/Recertification   Patient will continue to benefit from skilled PT services to modify and progress therapeutic interventions, address functional mobility deficits, address ROM deficits, address strength deficits, analyze and address soft tissue restrictions, analyze and cue movement patterns, analyze and modify body mechanics/ergonomics and assess and modify postural abnormalities to attain remaining goals.    Progress toward goals / Updated goals:    See PN     PLAN  [x]  Upgrade activities as tolerated YES Continue plan of care   []  Discharge due to :    []  Other:      Therapist: Pipo Jay PTA    Date: 4/3/2018 Time: 4:17 PM     Future Appointments  Date Time Provider Mena Kumar   4/5/2018 4:00 PM Italia Arango PT [facility]   4/10/2018 4:00 PM Tanja Suarez [facility]   4/12/2018 4:30 PM Italia Arango PT [facility]   5/29/2018 8:15 AM Klarissa Murray MD [address] [facility]

## 2018-04-10 ENCOUNTER — HOSPITAL ENCOUNTER (OUTPATIENT)
Dept: PHYSICAL THERAPY | Age: 63
Discharge: HOME | End: 2018-04-10
Payer: COMMERCIAL

## 2018-04-10 PROCEDURE — 97110 THERAPEUTIC EXERCISES: CPT

## 2018-04-10 NOTE — PROGRESS NOTES
PHYSICAL THERAPY - DAILY TREATMENT NOTE    Patient Name: Negro Rommel        Date: 4/10/2018  : 1955   YES Patient  Verified  Visit #:     Insurance: Payor: Misbah Avila / Plan: [address] PT / Product Type: Commerical /      In time: 405 Out time: 5   Total Treatment Time: 55     Medicare Time Tracking (below)   Total Timed Codes (min):  45 1:1 Treatment Time:       TREATMENT AREA =  Left shoulder pain [M25.512]    SUBJECTIVE  Pain Level (on 0 to 10 scale):  2  / 10   Medication Changes/New allergies or changes in medical history, any new surgeries or procedures? NO    If yes, update Summary List   Subjective Functional Status/Changes:  []  No changes reported     Pt states today is the day he can start doing a lot of things. Pt cleared to perform AROM in all planes and gradual strengthening per script. OBJECTIVE  Modalities Rationale:     decrease inflammation and decrease pain to improve patient's ability to perform pain free ADLs. min [] Estim, type/location:                                      []  att     []  unatt     []  w/US     []  w/ice    []  w/heat    min []  Mechanical Traction: type/lbs                   []  pro   []  sup   []  int   []  cont    []  before manual    []  after manual    min []  Ultrasound, settings/location:      min []  Iontophoresis w/ dexamethasone, location:                                               []  take home patch       []  in clinic   10 min [x]  Ice     []  Heat    location/position: Supine, (L) shoulder. min []  Vasopneumatic Device, press/temp:     min []  Other:    [x] Skin assessment post-treatment (if applicable):    [x]  intact    []  redness- no adverse reaction     []redness  adverse reaction:        45 min Therapeutic Exercise:  [x]  See flow sheet   Rationale:      increase ROM, increase strength and improve coordination to improve the patients ability to perform pain free ADLs.        min Patient Education: YES  Reviewed HEP   []  Progressed/Changed HEP based on: Other Objective/Functional Measures: Added multiple exercises to improve UE strength and mobility for ADLs. See flow sheet. (L) shoulder AAROM flexion = 152 deg      Post Treatment Pain Level (on 0 to 10) scale:   2  / 10     ASSESSMENT  Assessment/Changes in Function:     Difficulty with side lying abd. Pt reports no pain. However, has sticking point that he has trouble moving arm past.  Demonstrating good shoulder ROM. []  See Progress Note/Recertification   Patient will continue to benefit from skilled PT services to modify and progress therapeutic interventions, address functional mobility deficits, address ROM deficits, address strength deficits, analyze and address soft tissue restrictions, analyze and cue movement patterns, analyze and modify body mechanics/ergonomics and assess and modify postural abnormalities to attain remaining goals. Progress toward goals / Updated goals:    LTG #3 met.       PLAN  [x]  Upgrade activities as tolerated YES Continue plan of care   []  Discharge due to :    []  Other:      Therapist: Sai Ray PTA    Date: 4/10/2018 Time: 4:17 PM     Future Appointments  Date Time Provider Mena Kumar   4/12/2018 4:30 PM Sophia Almodovar, PT [facility]   4/17/2018 4:00 PM Sai Ray PTA [facility]   4/19/2018 4:00 PM Sophia Almodovar, PT [facility]   4/24/2018 4:00 PM Sai Ray, PTA [facility]   4/26/2018 4:00 PM Sophia Almodovar, PT [facility]   5/1/2018 4:00 PM Sai Ray, PTA [facility]   5/3/2018 4:30 PM Sophia Almodovar, PT [facility]   5/8/2018 4:00 PM Sophia Almodovar, PT [facility]   5/10/2018 4:00 PM Sophia Almodovar, PT [facility]   5/15/2018 4:00 PM Sophia Almodovar, PT [facility]   5/17/2018 4:00 PM ALEX Raza [facility]   5/29/2018 8:15 AM Jeevan Roman MD [facility] [facility]

## 2018-04-12 ENCOUNTER — HOSPITAL ENCOUNTER (OUTPATIENT)
Dept: PHYSICAL THERAPY | Age: 63
Discharge: HOME | End: 2018-04-12
Payer: COMMERCIAL

## 2018-04-12 PROCEDURE — 97110 THERAPEUTIC EXERCISES: CPT | Performed by: PHYSICAL THERAPIST

## 2018-04-12 PROCEDURE — 97140 MANUAL THERAPY 1/> REGIONS: CPT | Performed by: PHYSICAL THERAPIST

## 2018-04-12 NOTE — PROGRESS NOTES
Frank Leija   PHYSICAL THERAPY - DAILY TREATMENT NOTE    Patient Name: Kavitha Arellano        Date: 2018  : 1955   yes Patient  Verified  Visit #:     Insurance: Payor: Samella Oats / Plan: [address] PT / Product Type: Commerical /      In time: 500 Out time: 550   Total Treatment Time: 50     Medicare Time Tracking (below)   Total Timed Codes (min):  na 1:1 Treatment Time:  na     TREATMENT AREA =  Left shoulder pain [M25.512]    SUBJECTIVE  Pain Level (on 0 to 10 scale):  2.5   10   Medication Changes/New allergies or changes in medical history, any new surgeries or procedures?    no  If yes, update Summary List   Subjective Functional Status/Changes:  []  No changes reported     My shoulder is feeling better than the right          OBJECTIVE  Modalities Rationale:     decrease inflammation, decrease pain and increase tissue extensibility to improve patient's ability to elevate shoulder    min [] Estim, type/location:                                      []  att     []  unatt     []  w/US     []  w/ice    []  w/heat    min []  Mechanical Traction: type/lbs                   []  pro   []  sup   []  int   []  cont    []  before manual    []  after manual    min []  Ultrasound, settings/location:      min []  Iontophoresis w/ dexamethasone, location:                                               []  take home patch       []  in clinic   10 min [x]  Ice     []  Heat    location/position: Supine with bolster     min []  Vasopneumatic Device, press/temp:     min []  Other:    [x] Skin assessment post-treatment (if applicable):    [x]  intact    []  redness- no adverse reaction     []redness  adverse reaction:        25 min Therapeutic Exercise:  [x]  See flow sheet   Rationale:      increase ROM and increase strength to improve the patients ability to elevate shoulder     15 min Manual Therapy: ghj prom all directions, dtm post cuff, cfm/stretch biceps, stm anterior cuff    Rationale: decrease pain, increase ROM, increase tissue extensibility and decrease trigger points to improve patient's ability to elevate shoulder          min Patient Education:  yes  Reviewed HEP   []  Progressed/Changed HEP based on: Other Objective/Functional Measures:    Pt arrived 27' late to session modified accordingly  ttp along proximal biceps with reduced extensibility (unable to fully pronate and extend elbow)  Completed te as per flow sheet without pain     Post Treatment Pain Level (on 0 to 10) scale:   0  / 10     ASSESSMENT  Assessment/Changes in Function:     Despite lapse in care and trauma sustained at work pt is with good rom at this stage post op     []  See Progress Note/Recertification   Patient will continue to benefit from skilled PT services to modify and progress therapeutic interventions, address functional mobility deficits, address ROM deficits, address strength deficits, analyze and address soft tissue restrictions, analyze and cue movement patterns, analyze and modify body mechanics/ergonomics, assess and modify postural abnormalities and instruct in home and community integration to attain remaining goals.    Progress toward goals / Updated goals:    Continued with current program to address newly established goals     PLAN  []  Upgrade activities as tolerated yes Continue plan of care   []  Discharge due to :    []  Other:      Therapist: Columba Vaz PT    Date: 4/12/2018 Time: 5:45 PM     Future Appointments  Date Time Provider Mena Kumar   4/17/2018 4:00 PM Elsa Murillo PTA [facility]   4/19/2018 4:00 PM Columba Vaz PT [facility]   4/24/2018 4:00 PM Elsa Murillo PTA [facility]   4/26/2018 4:00 PM Columba Vaz PT [facility]   5/1/2018 4:00 PM Elsa Murillo PTA [facility]   5/3/2018 4:30 PM Columba Vaz PT [facility]   5/8/2018 4:00 PM [address], [address] [place] [address]   5/10/2018 4:00 PM [address], [address] [place] [address]   5/15/2018 4:00 PM Columba Vaz PT [address] [facility] 5/17/2018 4:00 PM [facility]   5/29/2018 8:15 AM Rod Quintana MD [address] [facility]

## 2018-04-17 ENCOUNTER — HOSPITAL ENCOUNTER (OUTPATIENT)
Dept: PHYSICAL THERAPY | Age: 63
Discharge: HOME | End: 2018-04-17
Payer: COMMERCIAL

## 2018-04-17 PROCEDURE — 97140 MANUAL THERAPY 1/> REGIONS: CPT

## 2018-04-17 PROCEDURE — 97110 THERAPEUTIC EXERCISES: CPT

## 2018-04-17 NOTE — PROGRESS NOTES
PHYSICAL THERAPY - DAILY TREATMENT NOTE    Patient Name: Negro Rommel        Date: 2018  : 1955   YES Patient  Verified  Visit #:     Insurance: Payor: Misbah Avila / Plan: [address] PT / Product Type: Commerical /      In time: 405 Out time: 505   Total Treatment Time: 60     Medicare Time Tracking (below)   Total Timed Codes (min):  50 1:1 Treatment Time:       TREATMENT AREA =  Left shoulder pain [M25.512]    SUBJECTIVE  Pain Level (on 0 to 10 scale):  2  / 10   Medication Changes/New allergies or changes in medical history, any new surgeries or procedures? NO    If yes, update Summary List   Subjective Functional Status/Changes:  []  No changes reported     No new complaints. Says MD wants him to start using small weights, but he forgot the prescription in the house. OBJECTIVE  Modalities Rationale:     decrease inflammation and decrease pain to improve patient's ability to perform pain free ADLs. min [] Estim, type/location:                                      []  att     []  unatt     []  w/US     []  w/ice    []  w/heat    min []  Mechanical Traction: type/lbs                   []  pro   []  sup   []  int   []  cont    []  before manual    []  after manual    min []  Ultrasound, settings/location:      min []  Iontophoresis w/ dexamethasone, location:                                               []  take home patch       []  in clinic   10 min [x]  Ice     []  Heat    location/position: Supine, (L) shoulder. min []  Vasopneumatic Device, press/temp:     min []  Other:    [x] Skin assessment post-treatment (if applicable):    [x]  intact    []  redness- no adverse reaction     []redness  adverse reaction:        35 min Therapeutic Exercise:  [x]  See flow sheet   Rationale:      increase ROM, increase strength and improve coordination to improve the patients ability to perform pain free ADLs. 15 Min Manual Therapy: (L) shoulder PROM.   TPR (L) periscapular mms. Rationale:      decrease pain, increase ROM, increase tissue extensibility and decrease trigger points to improve patient's ability to perform pain free ADLs. min Patient Education:  YES  Reviewed HEP   []  Progressed/Changed HEP based on: Other Objective/Functional Measures: Therex per flow sheet. Added t-band row/er to improve shoulder strength and stability for ADLs. Post Treatment Pain Level (on 0 to 10) scale:   0  / 10     ASSESSMENT  Assessment/Changes in Function:     Difficulty with (L) shoulder abd in side lying. []  See Progress Note/Recertification   Patient will continue to benefit from skilled PT services to modify and progress therapeutic interventions, address functional mobility deficits, address ROM deficits, address strength deficits, analyze and address soft tissue restrictions, analyze and cue movement patterns, analyze and modify body mechanics/ergonomics and assess and modify postural abnormalities to attain remaining goals. Progress toward goals / Updated goals:    Progressing toward LTG #1 and 3.       PLAN  [x]  Upgrade activities as tolerated YES Continue plan of care   []  Discharge due to :    []  Other:      Therapist: Jian Ellis PTA    Date: 4/17/2018 Time: 4:57 PM     Future Appointments  Date Time Provider Mena Kumar   4/19/2018 4:00 PM Marsha Pham, PT [facility]   4/24/2018 4:00 PM Jian Ellis PTA [facility]   4/26/2018 4:00 PM Marsha Pham, PT [facility]   5/1/2018 4:00 PM Jian Ellis PTA [facility]   5/3/2018 4:30 PM Marsha Pham, PT [facility]   5/8/2018 4:00 PM Marshamel Pham, PT [facility]   5/10/2018 4:00 PM Marsha Pham, PT [facility]   5/15/2018 4:00 PM Marshamel Pham, PT [facility]   5/17/2018 4:00 PM Marshamel Pham, PT [facility]   5/29/2018 8:15 AM Agnieszka Iyer MD [address] [facility]

## 2018-04-19 ENCOUNTER — HOSPITAL ENCOUNTER (OUTPATIENT)
Dept: PHYSICAL THERAPY | Age: 63
Discharge: HOME | End: 2018-04-19
Payer: COMMERCIAL

## 2018-04-19 PROCEDURE — 97110 THERAPEUTIC EXERCISES: CPT | Performed by: PHYSICAL THERAPIST

## 2018-04-19 PROCEDURE — 97140 MANUAL THERAPY 1/> REGIONS: CPT | Performed by: PHYSICAL THERAPIST

## 2018-04-19 NOTE — PROGRESS NOTES
Nidia Branch PHYSICAL THERAPY - DAILY TREATMENT NOTE    Patient Name: Saman Juarez        Date: 2018  : 1955   yes Patient  Verified  Visit #:     Insurance: Payor: Claude Backer / Plan: [address] / Product Type: Commerical /      In time: 427 Out time: 530   Total Treatment Time: 60     Medicare Time Tracking (below)   Total Timed Codes (min):  na 1:1 Treatment Time:  na     TREATMENT AREA =  Left shoulder pain [M25.512]    SUBJECTIVE  Pain Level (on 0 to 10 scale):  2/ 10   Medication Changes/New allergies or changes in medical history, any new surgeries or procedures?    no  If yes, update Summary List   Subjective Functional Status/Changes:  []  No changes reported     I saw the doctor on Tuesday and they gave me a new referral to continue with therapy to progress my strength/rom on my left but to also add in my right shoulder because my deltoids have atrophied.           OBJECTIVE  Modalities Rationale:     decrease inflammation, decrease pain and increase tissue extensibility to improve patient's ability to elevate shoulder    min [] Estim, type/location:                                      []  att     []  unatt     []  w/US     []  w/ice    []  w/heat    min []  Mechanical Traction: type/lbs                   []  pro   []  sup   []  int   []  cont    []  before manual    []  after manual    min []  Ultrasound, settings/location:      min []  Iontophoresis w/ dexamethasone, location:                                               []  take home patch       []  in clinic   10 min [x]  Ice     []  Heat    location/position: Supine with bolster     min []  Vasopneumatic Device, press/temp:     min []  Other:    [x] Skin assessment post-treatment (if applicable):    [x]  intact    []  redness- no adverse reaction     []redness  adverse reaction:        35 min Therapeutic Exercise:  [x]  See flow sheet   Rationale:      increase ROM and increase strength to improve the patients ability to elevate shoulder     15 min Manual Therapy: ghj prom all directions, dtm post cuff, cfm/stretch biceps, stm anterior cuff    Rationale:      decrease pain, increase ROM, increase tissue extensibility and decrease trigger points to improve patient's ability to elevate shoulder          min Patient Education:  yes  Reviewed HEP   []  Progressed/Changed HEP based on: Other Objective/Functional Measures:    Progressed te as per flow sheet to include strengthening of right side (MMT r shoudler 3-/5 abd/ir, er/flex 3+/5)   ttp along L medial deltoid with manual resistance reproducing pt chief pain c/o      Post Treatment Pain Level (on 0 to 10) scale:   3  / 10     ASSESSMENT  Assessment/Changes in Function:     Progressed arom as per protocol - continues to report \"catching or a hitch\" at mid abd rom that resolved after elevation >90 degrees. []  See Progress Note/Recertification   Patient will continue to benefit from skilled PT services to modify and progress therapeutic interventions, address functional mobility deficits, address ROM deficits, address strength deficits, analyze and address soft tissue restrictions, analyze and cue movement patterns, analyze and modify body mechanics/ergonomics, assess and modify postural abnormalities and instruct in home and community integration to attain remaining goals.    Progress toward goals / Updated goals:    Advanced to next stage in post op protocol to address strength and rom goals      PLAN  []  Upgrade activities as tolerated yes Continue plan of care   []  Discharge due to :    []  Other:      Therapist: Buzz High PT    Date: 4/19/2018 Time: 5:45 PM     Future Appointments  Date Time Provider Mena Kumar   4/24/2018 4:00 PM [facility]   4/26/2018 4:00 PM Buzz High, A[facility]   5/1/2018 4:00 PM [facility]   5/3/2018 4:30 PM [address] [place] [address],  [address]   5/8/2018 4:00 PM Buzz High, PT [facility]   5/10/2018 4:00 PM Louisa Mayfield, PT [facility]   5/15/2018 4:00 PM Louisa Mayfield, PT [facility]   5/17/2018 4:00 PM Louisa Mayfield, PT [facility]   5/29/2018 8:15 AM Geraldo Luque MD [address] [facility]

## 2018-04-24 ENCOUNTER — HOSPITAL ENCOUNTER (OUTPATIENT)
Dept: PHYSICAL THERAPY | Age: 63
Discharge: HOME | End: 2018-04-24
Payer: COMMERCIAL

## 2018-04-24 PROCEDURE — 97140 MANUAL THERAPY 1/> REGIONS: CPT | Performed by: PHYSICAL THERAPIST

## 2018-04-24 PROCEDURE — 97110 THERAPEUTIC EXERCISES: CPT | Performed by: PHYSICAL THERAPIST

## 2018-04-24 NOTE — PROGRESS NOTES
Josie Wetzel PHYSICAL THERAPY - DAILY TREATMENT NOTE    Patient Name: Ezekiel Romero        Date: 2018  : 1955   yes Patient  Verified  Visit #:     Insurance: Payor: Lauren Going / Plan: [address] / Product Type: Commerical /      In time: 532 Out time: 630   Total Treatment Time: 58     Medicare Time Tracking (below)   Total Timed Codes (min):  na 1:1 Treatment Time:  na     TREATMENT AREA =  Left shoulder pain [M25.512]    SUBJECTIVE  Pain Level (on 0 to 10 scale):  2/ 10   Medication Changes/New allergies or changes in medical history, any new surgeries or procedures?    no  If yes, update Summary List   Subjective Functional Status/Changes:  []  No changes reported     The appointment from last time really did its job because both of my shoulder were sore after last time and it lasted for a couple of days but I felt like I got some good strengthening in.  Would like to continues with current program         OBJECTIVE  Modalities Rationale:     decrease inflammation, decrease pain and increase tissue extensibility to improve patient's ability to elevate shoulder    min [] Estim, type/location:                                      []  att     []  unatt     []  w/US     []  w/ice    []  w/heat    min []  Mechanical Traction: type/lbs                   []  pro   []  sup   []  int   []  cont    []  before manual    []  after manual    min []  Ultrasound, settings/location:      min []  Iontophoresis w/ dexamethasone, location:                                               []  take home patch       []  in clinic   10 min [x]  Ice     []  Heat    location/position: Supine with bolster     min []  Vasopneumatic Device, press/temp:     min []  Other:    [x] Skin assessment post-treatment (if applicable):    [x]  intact    []  redness- no adverse reaction     []redness  adverse reaction:        33 min Therapeutic Exercise:  [x]  See flow sheet   Rationale:      increase ROM and increase strength to improve the patients ability to elevate shoulder     15 min Manual Therapy: ghj prom all directions, dtm post cuff, cfm/stretch biceps, stm anterior cuff    Rationale:      decrease pain, increase ROM, increase tissue extensibility and decrease trigger points to improve patient's ability to elevate shoulder          min Patient Education:  yes  Reviewed HEP   []  Progressed/Changed HEP based on: Other Objective/Functional Measures:    Pt pain levels were monitored throughout session and no increase reported  Still ttp along L medial deltoid     Post Treatment Pain Level (on 0 to 10) scale:   3  / 10     ASSESSMENT  Assessment/Changes in Function:   Continuing progressive strengthening program as pt has difficult raising arm overhead or performing and consisting work at shoulder height        []  See Progress Note/Recertification   Patient will continue to benefit from skilled PT services to modify and progress therapeutic interventions, address functional mobility deficits, address ROM deficits, address strength deficits, analyze and address soft tissue restrictions, analyze and cue movement patterns, analyze and modify body mechanics/ergonomics, assess and modify postural abnormalities and instruct in home and community integration to attain remaining goals.    Progress toward goals / Updated goals:    Steady progress towards established goal      PLAN  []  Upgrade activities as tolerated yes Continue plan of care   []  Discharge due to :    []  Other:      Therapist: Sean Aaron PT    Date: 4/24/2018 Time: 5:45 PM     Future Appointments  Date Time Provider Mena Kumar   4/26/2018 4:00 PM Sean Aaron PT [facility]   5/1/2018 4:00 PM Serena Mtz PTA [facility]   5/3/2018 4:30 PM Sean Aaron PT [facility]   5/8/2018 4:00 PM [address] [place] [address], [address] [place] [address]   5/10/2018 4:00 PM [address] [place] [address], [address] [place] [address]   5/15/2018 4:00 PM [address] [place] [address], [address] [place] [address]   5/17/2018 4:00 PM Rhonda Aniyah Paredes, PT [facility]   5/29/2018 8:15 AM Rod Quintana MD [address] [facility]

## 2018-05-01 ENCOUNTER — HOSPITAL ENCOUNTER (OUTPATIENT)
Dept: PHYSICAL THERAPY | Age: 63
Discharge: HOME | End: 2018-05-01
Payer: COMMERCIAL

## 2018-05-01 PROCEDURE — 97140 MANUAL THERAPY 1/> REGIONS: CPT

## 2018-05-01 PROCEDURE — 97110 THERAPEUTIC EXERCISES: CPT

## 2018-05-01 NOTE — PROGRESS NOTES
PHYSICAL THERAPY - DAILY TREATMENT NOTE    Patient Name: Israel Roberts        Date: 2018  : 1955   YES Patient  Verified  Visit #:     Insurance: Payor: Mali Fleming / Plan: [address] / Product Type: Commerical /      In time: 410 Out time: 455   Total Treatment Time: 45     Medicare Time Tracking (below)   Total Timed Codes (min):  35 1:1 Treatment Time:       TREATMENT AREA =  Left shoulder pain [M25.512]    SUBJECTIVE  Pain Level (on 0 to 10 scale):  4  / 10   Medication Changes/New allergies or changes in medical history, any new surgeries or procedures? NO    If yes, update Summary List   Subjective Functional Status/Changes:  []  No changes reported     Reports improvement. Has trouble with up and across motion. OBJECTIVE  Modalities Rationale:     decrease inflammation and decrease pain to improve patient's ability to perform pain free ADLs. min [] Estim, type/location:                                      []  att     []  unatt     []  w/US     []  w/ice    []  w/heat    min []  Mechanical Traction: type/lbs                   []  pro   []  sup   []  int   []  cont    []  before manual    []  after manual    min []  Ultrasound, settings/location:      min []  Iontophoresis w/ dexamethasone, location:                                               []  take home patch       []  in clinic   10 min [x]  Ice     []  Heat    location/position: Supine, (L) shoulder. min []  Vasopneumatic Device, press/temp:     min []  Other:    [x] Skin assessment post-treatment (if applicable):    [x]  intact    []  redness- no adverse reaction     []redness  adverse reaction:        20 min Therapeutic Exercise:  [x]  See flow sheet   Rationale:      increase ROM, increase strength and improve coordination to improve the patients ability to perform pain free overhead ADLs. 15 Min Manual Therapy: (L) shoulder PROM.     Rationale:      decrease pain, increase ROM, increase tissue extensibility and decrease trigger points to improve patient's ability to perform pain free ADLs. min Patient Education:  YES  Reviewed HEP   []  Progressed/Changed HEP based on: Other Objective/Functional Measures: Therex per flow sheet. Held multiple exercises due to pt late arrival.      Post Treatment Pain Level (on 0 to 10) scale:   1 / 10     ASSESSMENT  Assessment/Changes in Function:     PROM progressing. Demonstrating near full ER, >140 deg flexion. []  See Progress Note/Recertification   Patient will continue to benefit from skilled PT services to modify and progress therapeutic interventions, address functional mobility deficits, address ROM deficits, address strength deficits, analyze and address soft tissue restrictions, analyze and cue movement patterns and analyze and modify body mechanics/ergonomics to attain remaining goals. Progress toward goals / Updated goals:    No change in progress toward ltg's with today's session.       PLAN  [x]  Upgrade activities as tolerated YES Continue plan of care   []  Discharge due to :    []  Other:      Therapist: Alex Hope PTA    Date: 5/1/2018 Time: 4:50 PM     Future Appointments  Date Time Provider Mena Kumar   5/3/2018 4:30 PM Glennis Kanner, PT [facility]   5/8/2018 4:00 PM Glennis Kanner, PT [facility]   5/10/2018 4:00 PM Glennis Kanner, PT [facility]   5/15/2018 4:00 PM Glennis Kanner, PT [facility]   5/17/2018 4:00 PM Glennis Kanner, PT [facility]   5/29/2018 8:15 AM Sirena Cooper MD [address] [facility]

## 2018-05-03 ENCOUNTER — HOSPITAL ENCOUNTER (OUTPATIENT)
Dept: PHYSICAL THERAPY | Age: 63
Discharge: HOME | End: 2018-05-03
Payer: COMMERCIAL

## 2018-05-03 PROCEDURE — 97110 THERAPEUTIC EXERCISES: CPT | Performed by: PHYSICAL THERAPIST

## 2018-05-03 PROCEDURE — 97140 MANUAL THERAPY 1/> REGIONS: CPT | Performed by: PHYSICAL THERAPIST

## 2018-05-03 NOTE — PROGRESS NOTES
Johnson Him   PHYSICAL THERAPY - DAILY TREATMENT NOTE    Patient Name: Primo Melton        Date: 5/3/2018  : 1955   yes Patient  Verified  Visit #:     Insurance: Payor: Josie Bhakta / Plan: [address] / Product Type: Commerical /      In time: 435 Out time: 538   Total Treatment Time: 63     Medicare Time Tracking (below)   Total Timed Codes (min):  na 1:1 Treatment Time:  na     TREATMENT AREA =  Left shoulder pain [M25.512]    SUBJECTIVE  Pain Level (on 0 to 10 scale):  2/ 10   Medication Changes/New allergies or changes in medical history, any new surgeries or procedures?    no  If yes, update Summary List   Subjective Functional Status/Changes:  []  No changes reported     See pn         OBJECTIVE  Modalities Rationale:     decrease inflammation, decrease pain and increase tissue extensibility to improve patient's ability to elevate shoulder    min [] Estim, type/location:                                      []  att     []  unatt     []  w/US     []  w/ice    []  w/heat    min []  Mechanical Traction: type/lbs                   []  pro   []  sup   []  int   []  cont    []  before manual    []  after manual    min []  Ultrasound, settings/location:      min []  Iontophoresis w/ dexamethasone, location:                                               []  take home patch       []  in clinic   10 min [x]  Ice     []  Heat    location/position: Supine with bolster     min []  Vasopneumatic Device, press/temp:     min []  Other:    [x] Skin assessment post-treatment (if applicable):    [x]  intact    []  redness- no adverse reaction     []redness  adverse reaction:        38 min Therapeutic Exercise:  [x]  See flow sheet   Rationale:      increase ROM and increase strength to improve the patients ability to elevate shoulder     15 min Manual Therapy: ghj prom all directions, dtm post cuff, cfm/stretch biceps, stm anterior cuff    Rationale:      decrease pain, increase ROM, increase tissue extensibility and decrease trigger points to improve patient's ability to elevate shoulder          min Patient Education:  yes  Reviewed HEP   []  Progressed/Changed HEP based on: Other Objective/Functional Measures:    See pn   Post Treatment Pain Level (on 0 to 10) scale:   1  / 10     ASSESSMENT  Assessment/Changes in Function:   See pn       []  See Progress Note/Recertification   Patient will continue to benefit from skilled PT services to modify and progress therapeutic interventions, address functional mobility deficits, address ROM deficits, address strength deficits, analyze and address soft tissue restrictions, analyze and cue movement patterns, analyze and modify body mechanics/ergonomics, assess and modify postural abnormalities and instruct in home and community integration to attain remaining goals.    Progress toward goals / Updated goals:    See pn     PLAN  []  Upgrade activities as tolerated yes Continue plan of care   []  Discharge due to :    []  Other:      Therapist: Yulisa Huerta PT    Date: 5/3/2018 Time: 5:45 PM     Future Appointments  Date Time Provider Mena Kumar   5/8/2018 4:00 PM [address] [place] [address] [address]   5/10/2018 4:00 PM Yulisa Huerta, PT [facility]   5/15/2018 4:00 PM Yulisa Huerta PT [facility]   5/17/2018 4:00 PM Yulisa Huerta PT [facility]   5/29/2018 8:15 AM MD Bryanna Hernandez

## 2018-05-03 NOTE — PROGRESS NOTES
[address] PHYSICAL THERAPY  [address] [place] [address] 51, [place] [address],[facility], [facility] - Phone: [phone]  Fax: [phone]  PROGRESS NOTE  Patient Name: Carol Biswas : 1955   Treatment/Medical Diagnosis: Left shoulder pain [M25.512]   Referral Source: Conner Willis     Date of Initial Visit: 3/1/2018 Attended Visits: 10 Missed Visits: 5     SUMMARY OF TREATMENT  PT has consisted of initial evaluation, therapeutic exercises for B shoulder rom/strengthening, therapeutic activities, HEP, manual therapy (prom/mobs/stm), patient education, and modalities. CURRENT STATUS  Pt has made slow but steady progress with PT. Following your recent office visit we have progressed L shoulder rom/strength and added in exercises for R UE per your request. He still continues to have lateral pain with any elevation >110 with upper trapezius compensation. He has had intermittent compliance with HEP participation and PT attendance which has reduced rate of progression. Prom/arom flex 108(without hike)/142, abd 78/99, er 45/63, ir l5 spina level      Goal/Measure of Progress Goal Met? 1. Decrease max pain 50-75% to assist with reaching and lifting   Status at last Eval: 6/10  Current Status:  4/10 progressing   2. Increase FOTO score to 75 to show functional improvment   Status at last Eval: 59 Current Status: 59/100 progressing   3. Increase L shoulder PROM to 140 deg to assist with ADLs. Status at last Eval: 137 deg flex  Current Status:  yes     New Goals to be achieved in __4__  weeks:  1. Achieve goal #1  2. Achieve goal #2  3. Pt will be able to self manage sx with advanced hep in order to prepare for d/c   RECOMMENDATIONS  Pt to continue 2x weekly for up to an additional 4 weeks to improve functional ability for ADLs. Thank you for this referral.   If you have any questions/comments please contact us directly at [phone].    Thank you for allowing us to assist in the care of your patient. Mariano Taylor, PT, DPT, MTC  Date: 5/3/2018   PT Signature:  Time: 4:53 PM   NOTE TO PHYSICIAN:  PLEASE COMPLETE THE ORDERS BELOW AND FAX TO   [facility] Physical Therapy: [phone]  If you are unable to process this request in 24 hours please contact our office: [phone]    ___ I have read the above report and request that my patient continue as recommended.   ___ I have read the above report and request that my patient continue therapy with the following changes/special instructions:_________________________________________________________   ___ I have read the above report and request that my patient be discharged from therapy.      Physician Signature:        Date:       Time:

## 2018-05-08 ENCOUNTER — HOSPITAL ENCOUNTER (OUTPATIENT)
Dept: PHYSICAL THERAPY | Age: 63
Discharge: HOME | End: 2018-05-08
Payer: COMMERCIAL

## 2018-05-08 PROCEDURE — 97140 MANUAL THERAPY 1/> REGIONS: CPT | Performed by: PHYSICAL THERAPIST

## 2018-05-08 PROCEDURE — 97110 THERAPEUTIC EXERCISES: CPT | Performed by: PHYSICAL THERAPIST

## 2018-05-08 NOTE — PROGRESS NOTES
[facility] PH[facility] - DAILY TREATMENT NOTE    Patient Name: Dyllan Donnelly        Date: 2018  : 1955   yes Patient  Verified  Visit #:     Insurance: Payor: [employer] / Plan: [address] PT / Product Type: Commerical /      In time: 415 Out time: 510   Total Treatment Time: 55     Medicare Time Tracking (below)   Total Timed Codes (min):  na 1:1 Treatment Time:  na     TREATMENT AREA =  Left shoulder pain [M25.512]    SUBJECTIVE  Pain Level (on 0 to 10 scale):  1/ 10   Medication Changes/New allergies or changes in medical history, any new surgeries or procedures?    no  If yes, update Summary List   Subjective Functional Status/Changes:  []  No changes reported     I was sore for like a day after last time but it was tolerable.  I feel like my rom is getting better        OBJECTIVE  Modalities Rationale:     decrease inflammation, decrease pain and increase tissue extensibility to improve patient's ability to elevate shoulder    min [] Estim, type/location:                                      []  att     []  unatt     []  w/US     []  w/ice    []  w/heat    min []  Mechanical Traction: type/lbs                   []  pro   []  sup   []  int   []  cont    []  before manual    []  after manual    min []  Ultrasound, settings/location:      min []  Iontophoresis w/ dexamethasone, location:                                               []  take home patch       []  in clinic   10 min [x]  Ice     []  Heat    location/position: Supine with bolster     min []  Vasopneumatic Device, press/temp:     min []  Other:    [x] Skin assessment post-treatment (if applicable):    [x]  intact    []  redness- no adverse reaction     []redness  adverse reaction:        30 min Therapeutic Exercise:  [x]  See flow sheet   Rationale:      increase ROM and increase strength to improve the patients ability to elevate shoulder     15 min Manual Therapy: ghj prom all directions, dtm post cuff, cfm/stretch biceps, stm anterior cuff    Rationale:      decrease pain, increase ROM, increase tissue extensibility and decrease trigger points to improve patient's ability to elevate shoulder          min Patient Education:  yes  Reviewed HEP   []  Progressed/Changed HEP based on: Other Objective/Functional Measures:    Pt arrived to session 15' late on cell phone - advised on company policy on cell phone use in clinic. Modified te due to time constraint  ttp along medial deltoid area   Post Treatment Pain Level (on 0 to 10) scale:   1  / 10     ASSESSMENT  Assessment/Changes in Function:     Continues with improving arom in all directions  Er and ir strength still slowly progressing      []  See Progress Note/Recertification   Patient will continue to benefit from skilled PT services to modify and progress therapeutic interventions, address functional mobility deficits, address ROM deficits, address strength deficits, analyze and address soft tissue restrictions, analyze and cue movement patterns, analyze and modify body mechanics/ergonomics, assess and modify postural abnormalities and instruct in home and community integration to attain remaining goals.    Progress toward goals / Updated goals:  FOTO increased an additional 6 points - progress with LTG      PLAN  []  Upgrade activities as tolerated yes Continue plan of care   []  Discharge due to :    []  Other:      Therapist: Lien Paz, PT    Date: 5/8/2018 Time: 5:45 PM     Future Appointments  Date Time Provider Mena Kumar   5/10/2018 4:00 PM Lien Paz PT [facility]   5/15/2018 4:00 PM Lien Paz PT [facility]   5/17/2018 4:00 PM Lien Paz PT [facility]   5/29/2018 8:15 AM Conchis Edwards MD [facility]

## 2018-05-10 ENCOUNTER — HOSPITAL ENCOUNTER (OUTPATIENT)
Dept: PHYSICAL THERAPY | Age: 63
Discharge: HOME | End: 2018-05-10
Payer: COMMERCIAL

## 2018-05-10 PROCEDURE — 97140 MANUAL THERAPY 1/> REGIONS: CPT | Performed by: PHYSICAL THERAPIST

## 2018-05-10 PROCEDURE — 97110 THERAPEUTIC EXERCISES: CPT | Performed by: PHYSICAL THERAPIST

## 2018-05-10 NOTE — PROGRESS NOTES
[facility] PHYSICAL THERAPY - DAILY TREATMENT NOTE    Patient Name: Primo Melton        Date: 5/10/2018  : 1955   yes Patient  Verified  Visit #:   15  of   18  Insurance: Payor: Josie Bhakta / Plan: [address] PT / Product Type: Commerical /      In time: 400 Out time: 504   Total Treatment Time: 52     Medicare Time Tracking (below)   Total Timed Codes (min):  na 1:1 Treatment Time:  na     TREATMENT AREA =  Left shoulder pain [M25.512]    SUBJECTIVE  Pain Level (on 0 to 10 scale):  0/ 10   Medication Changes/New allergies or changes in medical history, any new surgeries or procedures?    no  If yes, update Summary List   Subjective Functional Status/Changes:  []  No changes reported   No increase in pain after last time.         OBJECTIVE  Modalities Rationale:     decrease inflammation, decrease pain and increase tissue extensibility to improve patient's ability to elevate shoulder    min [] Estim, type/location:                                      []  att     []  unatt     []  w/US     []  w/ice    []  w/heat    min []  Mechanical Traction: type/lbs                   []  pro   []  sup   []  int   []  cont    []  before manual    []  after manual    min []  Ultrasound, settings/location:      min []  Iontophoresis w/ dexamethasone, location:                                               []  take home patch       []  in clinic   10 min [x]  Ice     []  Heat    location/position: Supine with bolster     min []  Vasopneumatic Device, press/temp:     min []  Other:    [x] Skin assessment post-treatment (if applicable):    [x]  intact    []  redness- no adverse reaction     []redness  adverse reaction:        30 min Therapeutic Exercise:  [x]  See flow sheet   Rationale:      increase ROM and increase strength to improve the patients ability to elevate shoulder     12 min Manual Therapy: ghj prom all directions, dtm post cuff, UT and lat release,  cfm/stretch biceps, stm anterior cuff    Rationale: decrease pain, increase ROM, increase tissue extensibility and decrease trigger points to improve patient's ability to elevate shoulder          min Patient Education:  yes  Reviewed HEP   []  Progressed/Changed HEP based on: Other Objective/Functional Measures:    Palpable trp along mid ut and supraspinatus muscle belly  Increased pres as per flow sheet to improve strength/stability shoulder height and a ove   Post Treatment Pain Level (on 0 to 10) scale:   0  / 10     ASSESSMENT  Assessment/Changes in Function:     Upper trapezius dominates lifting exercises vs rtc/deltoids unless cued      []  See Progress Note/Recertification   Patient will continue to benefit from skilled PT services to modify and progress therapeutic interventions, address functional mobility deficits, address ROM deficits, address strength deficits, analyze and address soft tissue restrictions, analyze and cue movement patterns, analyze and modify body mechanics/ergonomics, assess and modify postural abnormalities and instruct in home and community integration to attain remaining goals. Progress toward goals / Updated goals:   Increasing pres to address strength goals      PLAN  []  Upgrade activities as tolerated yes Continue plan of care   []  Discharge due to :    []  Other:      Therapist: Burak Arellano PT    Date: 5/10/2018 Time: 5:45 PM     Future Appointments  Date Time Provider Mena Kumar   5/15/2018 4:00 PM Burak Arellano PT [facility]   5/17/2018 4:00 PM Burak Arellano PT [facility]   5/29/2018 8:15 AM Mary Johnson MD [facility]

## 2018-05-15 ENCOUNTER — HOSPITAL ENCOUNTER (OUTPATIENT)
Dept: PHYSICAL THERAPY | Age: 63
End: 2018-05-15
Payer: COMMERCIAL

## 2018-05-22 ENCOUNTER — HOSPITAL ENCOUNTER (OUTPATIENT)
Dept: PHYSICAL THERAPY | Age: 63
Discharge: HOME | End: 2018-05-22
Payer: COMMERCIAL

## 2018-05-22 PROCEDURE — 97110 THERAPEUTIC EXERCISES: CPT

## 2018-05-22 NOTE — PROGRESS NOTES
PHYSICAL THERAPY - DAILY TREATMENT NOTE    Patient Name: Susan Ferreira        Date: 2018  : 1955   YES Patient  Verified  Visit #:   15   of   18  Insurance: Payor: Lorne Sable / Plan: [address] PT / Product Type: Commerical /      In time: 4 Out time: 455   Total Treatment Time: 55     Medicare Time Tracking (below)   Total Timed Codes (min):  55 1:1 Treatment Time:       TREATMENT AREA =  Left shoulder pain [M25.512]    SUBJECTIVE  Pain Level (on 0 to 10 scale):  0  / 10   Medication Changes/New allergies or changes in medical history, any new surgeries or procedures? NO    If yes, update Summary List   Subjective Functional Status/Changes:  []  No changes reported     Still having some trouble in the bicep. Reports recent max pain of 1/10. OBJECTIVE    55 min Therapeutic Exercise:  [x]  See flow sheet   Rationale:      increase ROM, increase strength and improve coordination to improve the patients ability to perform pain free ADLs. min Patient Education:  YES  Reviewed HEP   []  Progressed/Changed HEP based on: Other Objective/Functional Measures:    Provided long-term HEP. (L) shoulder AROM:   Flex = 125 deg  LOKI = C7   FIR = L4      Post Treatment Pain Level (on 0 to 10) scale:   0  / 10     ASSESSMENT  Assessment/Changes in Function:     See DC      []  See Progress Note/Recertification   Patient will continue to benefit from skilled PT services to   Progress toward goals / Updated goals:    See DC      PLAN  []  Upgrade activities as tolerated No Continue plan of care   [x]  Discharge due to : Program complete.     []  Other:      Therapist: Jian Ellis PTA    Date: 2018 Time: 4:07 PM     Future Appointments  Date Time Provider Mena Kumar   2018 8:15 AM Agnieszka Iyer MD [facility]

## 2018-05-22 NOTE — PROGRESS NOTES
[facility] PHYSICAL THERAPY  [address] [place] [address],[facility], 70 [facility] - Phone: [phone]  Fax: [phone] SUMMARY  Patient Name: Tu Larios YOB: 1955   Treatment/Medical Diagnosis: Left shoulder pain [M25.512]   Referral Source: Tess Ying     Date of Initial Visit: 3/1 Attended Visits: 14 Missed Visits: 6     SUMMARY OF TREATMENT  PT has consisted of initial evaluation, therapeutic exercises, HEP, manual therapy, patient education, and modalities. CURRENT STATUS  [facility] w/ c/o (L) shoulder pain following TSA on 2018. Pt currently reporting /10 max pain. (L) shoulder AROM: flex 125 deg, LOKI C7, FIR L4. Pt is reporting continued pain in the bicep. Pt has received long-term HEP and instructed to perform 5-6x weekly. Pt verbalized understanding. Pt requesting DC at this time to save visits for possible knee treatment later this calendar year. Goal/Measure of Progress Goal Met? 1. Decrease max pain 50-75% to assist with reaching and lifting   Status at last Eval: 9/10 max  Current Status: 1/10 yes   2. Increase FOTO score to 75 to show functional improvment   Status at last Eval: 38 @ IE  Current Status: 65 current progressing   3. Pt will be able to self manage sx with advanced hep in order to prepare for d/c    Status at last Eval: HEP established Current Status: Intermittent compliance with HEP progressing     RECOMMENDATIONS  Discontinue therapy. Progressing towards or have reached established goals. If you have any questions/comments please contact us directly at [phone]. Thank you for allowing us to assist in the care of your patient.     LPTA Signature: Stephanie Diaz PTA Date: 2018   Therapist Signature:  Time: 5:05 PM

## 2021-02-14 NOTE — PROGRESS NOTES
[address] [place] [address] PHYSICAL THERAPY  [address] [place] [address] 51, [place] [address],[facility], 70 [facility] - Phone: [phone]  Fax: [phone]  PROGRESS NOTE  Patient Name: Eva Huitron : 1955   Treatment/Medical Diagnosis: Left shoulder pain [M25.512]   Referral Source: Fany Lozano     Date of Initial Visit: 3/1/2018 Attended Visits: 4 Missed Visits: 3     SUMMARY OF TREATMENT  PT has consisted of initial evaluation, therapeutic exercises, therapeutic activities, HEP, manual therapy, patient education, and modalities. CURRENT STATUS  [facility]. Pt currently reports 6/10 max pain due to an incident at work. Pt attempted to catch a falling gas canister using the (L) UE. Pt followed up with MD due to pain following this incident and was cleared to return to PT after 1 week of rest, no damage reported to the shoulder. Pt feels as though the incident has set him back pain wise. Current shoulder PROM = flex 137 deg. FOTO = 59, GROC = +2. Pt is (I) and compliant with current HEP. Goal/Measure of Progress Goal Met? 1. Decrease max pain 50-75% to assist with reaching and lifting   Status at last Eval: 10  Current Status: 6/10  progressing   2. Increase FOTO score to 45% to show functional improvment   Status at last Eval: 38 Current Status: 59 yes   3. Increase L shoulder PROM to 140 deg to assist with ADLs. Status at last Eval: 90 deg flex/abd Current Status: 137 deg flex  progressing     New Goals to be achieved in __4__  weeks:  1. Decrease max pain 50-75% to assist with reaching and lifting  2. Increase FOTO score to 73% to show functional improvment  3. Increase L shoulder PROM to 140 deg to assist with ADLs. RECOMMENDATIONS  Pt to continue 2-3x weekly for up to an additional 4 weeks to improve functional ability for ADLs.   Thank you for this referral.   If you have any questions/comments please contact us directly at [phone]. Thank you for allowing us to assist in the care of your patient. LPTA Signature: Pratik Obrien PTA  Date: 4/3/2018   PT Signature:  Time: 4:53 PM   NOTE TO PHYSICIAN:  PLEASE COMPLETE THE ORDERS BELOW AND FAX TO   [facility] Physical Therapy: [phone]  If you are unable to process this request in 24 hours please contact our office: [phone]    ___ I have read the above report and request that my patient continue as recommended.   ___ I have read the above report and request that my patient continue therapy with the following changes/special instructions:_________________________________________________________   ___ I have read the above report and request that my patient be discharged from therapy.      Physician Signature:        Date:       Time: Male